# Patient Record
Sex: FEMALE | Race: BLACK OR AFRICAN AMERICAN | NOT HISPANIC OR LATINO
[De-identification: names, ages, dates, MRNs, and addresses within clinical notes are randomized per-mention and may not be internally consistent; named-entity substitution may affect disease eponyms.]

---

## 2022-08-02 ENCOUNTER — APPOINTMENT (OUTPATIENT)
Dept: PULMONOLOGY | Facility: CLINIC | Age: 27
End: 2022-08-02

## 2022-08-02 VITALS
OXYGEN SATURATION: 100 % | WEIGHT: 226 LBS | HEART RATE: 70 BPM | DIASTOLIC BLOOD PRESSURE: 77 MMHG | SYSTOLIC BLOOD PRESSURE: 132 MMHG | BODY MASS INDEX: 37.65 KG/M2 | HEIGHT: 65 IN

## 2022-08-02 DIAGNOSIS — R06.00 DYSPNEA, UNSPECIFIED: ICD-10-CM

## 2022-08-02 DIAGNOSIS — J45.909 UNSPECIFIED ASTHMA, UNCOMPLICATED: ICD-10-CM

## 2022-08-02 PROCEDURE — 94060 EVALUATION OF WHEEZING: CPT

## 2022-08-02 PROCEDURE — 99204 OFFICE O/P NEW MOD 45 MIN: CPT | Mod: 25

## 2022-08-02 PROCEDURE — 94729 DIFFUSING CAPACITY: CPT

## 2022-08-02 PROCEDURE — 94727 GAS DIL/WSHOT DETER LNG VOL: CPT

## 2022-08-02 NOTE — PROCEDURE
[FreeTextEntry1] : The pulmonary function testing done reveals small airways dysfunction and significant bronchodilator response consistent with mild bronchial asthma.

## 2022-08-02 NOTE — DISCUSSION/SUMMARY
[FreeTextEntry1] : Patient has mild bronchial asthma which may be causing dyspnea.  The patient will be given inhaled bronchodilators.\par The patient has clinical features consistent with sleep apnea.  Will do home sleep study.\par The patient was advised about relaxation techniques including yoga and meditation.  Written instructions were given.\par Sleep hygiene techniques were instructed in detail.

## 2022-08-02 NOTE — HISTORY OF PRESENT ILLNESS
[Never] : never [TextBox_4] : 26 year old lady with h/o shortness of breath on and off associated with wheezing.  There is some coughing associated with it. She has been snoring and has witnessed apnea. \par She has gained more than 40 lbs in 2 years. She drinks alcohol 3-4 times a week.\par She has difficulty falling and often takes NyQuil. She sleeps from 11.30 Pm and wakes up at 10 AM. She often wakes up in the middle of the night as she has to use bathroom. \par She works with mentally ill.\par She walks and does not do formal exercise. \par Her father had sleep apnea.\par Her Lincoln scale for daytime somnolence is 11.

## 2022-08-09 ENCOUNTER — APPOINTMENT (OUTPATIENT)
Dept: PULMONOLOGY | Facility: CLINIC | Age: 27
End: 2022-08-09

## 2022-08-29 ENCOUNTER — NON-APPOINTMENT (OUTPATIENT)
Age: 27
End: 2022-08-29

## 2022-09-12 ENCOUNTER — APPOINTMENT (OUTPATIENT)
Dept: PULMONOLOGY | Facility: CLINIC | Age: 27
End: 2022-09-12

## 2022-09-12 VITALS
HEIGHT: 65 IN | OXYGEN SATURATION: 97 % | TEMPERATURE: 96.8 F | HEART RATE: 80 BPM | DIASTOLIC BLOOD PRESSURE: 92 MMHG | BODY MASS INDEX: 37.15 KG/M2 | WEIGHT: 223 LBS | SYSTOLIC BLOOD PRESSURE: 132 MMHG

## 2022-09-12 DIAGNOSIS — G47.01 INSOMNIA DUE TO MEDICAL CONDITION: ICD-10-CM

## 2022-09-12 DIAGNOSIS — G47.33 OBSTRUCTIVE SLEEP APNEA (ADULT) (PEDIATRIC): ICD-10-CM

## 2022-09-12 PROCEDURE — 99214 OFFICE O/P EST MOD 30 MIN: CPT

## 2022-09-12 NOTE — HISTORY OF PRESENT ILLNESS
[Never] : never [TextBox_4] : Ms. Bloom returns today with her mother.  Since the last visit she states that she has improved her diet.  She is drinking much less alcohol.  She has lost 3 pounds of weight about which she is very happy.  Her sleep pattern has not changed.\par She had a home sleep study which reveals moderate sleep apnea.  She had questions about it.  She was advised to start using PAP.\par Cough and dyspnea have resolved.\par \par 8/2/22\par 26 year old lady with h/o shortness of breath on and off associated with wheezing.  There is some coughing associated with it. She has been snoring and has witnessed apnea. \par She has gained more than 40 lbs in 2 years. She drinks alcohol 3-4 times a week.\par She has difficulty falling and often takes NyQuil. She sleeps from 11.30 Pm and wakes up at 10 AM. She often wakes up in the middle of the night as she has to use bathroom. \par She works with mentally ill.\par She walks and does not do formal exercise. \par Her father had sleep apnea.\par Her [place] scale for daytime somnolence is 11.

## 2022-09-12 NOTE — DISCUSSION/SUMMARY
[FreeTextEntry1] : Test results were discussed with the patient.  She was advised that the best treatment modality for her would be  positive airway pressure.  The patient has hypertension at this young age.  So she requires aggressive treatment.\par Agrees to try Pap treatment.\par She was again advised to follow diet and exercise daily.  She was advised to take melatonin and magnesium prior to going to bed.  Sleep hygiene techniques were discussed again.

## 2022-10-10 ENCOUNTER — RX RENEWAL (OUTPATIENT)
Age: 27
End: 2022-10-10

## 2022-10-10 RX ORDER — BUDESONIDE AND FORMOTEROL FUMARATE DIHYDRATE 80; 4.5 UG/1; UG/1
80-4.5 AEROSOL RESPIRATORY (INHALATION)
Qty: 30.6 | Refills: 0 | Status: ACTIVE | COMMUNITY
Start: 2022-08-02 | End: 1900-01-01

## 2022-10-11 ENCOUNTER — APPOINTMENT (OUTPATIENT)
Dept: PULMONOLOGY | Facility: CLINIC | Age: 27
End: 2022-10-11

## 2022-11-21 ENCOUNTER — APPOINTMENT (OUTPATIENT)
Dept: PULMONOLOGY | Facility: CLINIC | Age: 27
End: 2022-11-21

## 2024-05-06 ENCOUNTER — APPOINTMENT (OUTPATIENT)
Dept: OBGYN | Facility: CLINIC | Age: 29
End: 2024-05-06
Payer: MEDICAID

## 2024-05-06 ENCOUNTER — OUTPATIENT (OUTPATIENT)
Dept: OUTPATIENT SERVICES | Facility: HOSPITAL | Age: 29
LOS: 1 days | End: 2024-05-06
Payer: MEDICAID

## 2024-05-06 ENCOUNTER — LABORATORY RESULT (OUTPATIENT)
Age: 29
End: 2024-05-06

## 2024-05-06 VITALS
OXYGEN SATURATION: 99 % | HEIGHT: 65 IN | DIASTOLIC BLOOD PRESSURE: 79 MMHG | RESPIRATION RATE: 16 BRPM | HEART RATE: 90 BPM | SYSTOLIC BLOOD PRESSURE: 114 MMHG | BODY MASS INDEX: 34.99 KG/M2 | TEMPERATURE: 98.1 F | WEIGHT: 210 LBS

## 2024-05-06 DIAGNOSIS — Z34.00 ENCOUNTER FOR SUPERVISION OF NORMAL FIRST PREGNANCY, UNSPECIFIED TRIMESTER: ICD-10-CM

## 2024-05-06 DIAGNOSIS — Z78.9 OTHER SPECIFIED HEALTH STATUS: ICD-10-CM

## 2024-05-06 DIAGNOSIS — Z82.49 FAMILY HISTORY OF ISCHEMIC HEART DISEASE AND OTHER DISEASES OF THE CIRCULATORY SYSTEM: ICD-10-CM

## 2024-05-06 DIAGNOSIS — Z83.3 FAMILY HISTORY OF DIABETES MELLITUS: ICD-10-CM

## 2024-05-06 DIAGNOSIS — Z34.90 ENCOUNTER FOR SUPERVISION OF NORMAL PREGNANCY, UNSPECIFIED, UNSPECIFIED TRIMESTER: ICD-10-CM

## 2024-05-06 PROCEDURE — 87591 N.GONORRHOEAE DNA AMP PROB: CPT

## 2024-05-06 PROCEDURE — 86778 TOXOPLASMA ANTIBODY IGM: CPT

## 2024-05-06 PROCEDURE — 83036 HEMOGLOBIN GLYCOSYLATED A1C: CPT

## 2024-05-06 PROCEDURE — 86787 VARICELLA-ZOSTER ANTIBODY: CPT

## 2024-05-06 PROCEDURE — 86593 SYPHILIS TEST NON-TREP QUANT: CPT

## 2024-05-06 PROCEDURE — 81003 URINALYSIS AUTO W/O SCOPE: CPT

## 2024-05-06 PROCEDURE — 86900 BLOOD TYPING SEROLOGIC ABO: CPT

## 2024-05-06 PROCEDURE — 85025 COMPLETE CBC W/AUTO DIFF WBC: CPT

## 2024-05-06 PROCEDURE — 81220 CFTR GENE COM VARIANTS: CPT

## 2024-05-06 PROCEDURE — 87389 HIV-1 AG W/HIV-1&-2 AB AG IA: CPT

## 2024-05-06 PROCEDURE — 86592 SYPHILIS TEST NON-TREP QUAL: CPT

## 2024-05-06 PROCEDURE — 87086 URINE CULTURE/COLONY COUNT: CPT

## 2024-05-06 PROCEDURE — 86765 RUBEOLA ANTIBODY: CPT

## 2024-05-06 PROCEDURE — 86803 HEPATITIS C AB TEST: CPT

## 2024-05-06 PROCEDURE — 86777 TOXOPLASMA ANTIBODY: CPT

## 2024-05-06 PROCEDURE — 99203 OFFICE O/P NEW LOW 30 MIN: CPT

## 2024-05-06 PROCEDURE — 86780 TREPONEMA PALLIDUM: CPT

## 2024-05-06 PROCEDURE — G0463: CPT

## 2024-05-06 PROCEDURE — 86480 TB TEST CELL IMMUN MEASURE: CPT

## 2024-05-06 PROCEDURE — 83655 ASSAY OF LEAD: CPT

## 2024-05-06 PROCEDURE — 87340 HEPATITIS B SURFACE AG IA: CPT

## 2024-05-06 PROCEDURE — 81243 FMR1 GEN ALY DETC ABNL ALLEL: CPT

## 2024-05-06 PROCEDURE — 86850 RBC ANTIBODY SCREEN: CPT

## 2024-05-06 PROCEDURE — 86762 RUBELLA ANTIBODY: CPT

## 2024-05-06 PROCEDURE — 81025 URINE PREGNANCY TEST: CPT

## 2024-05-06 PROCEDURE — 81329 SMN1 GENE DOS/DELETION ALYS: CPT

## 2024-05-06 PROCEDURE — 36415 COLL VENOUS BLD VENIPUNCTURE: CPT

## 2024-05-06 PROCEDURE — 83020 HEMOGLOBIN ELECTROPHORESIS: CPT

## 2024-05-06 PROCEDURE — 87491 CHLMYD TRACH DNA AMP PROBE: CPT

## 2024-05-06 RX ORDER — PRENATAL VIT NO.130/IRON/FOLIC 27MG-0.8MG
27-0.8 TABLET ORAL DAILY
Qty: 30 | Refills: 11 | Status: ACTIVE | COMMUNITY
Start: 2024-05-06 | End: 1900-01-01

## 2024-05-07 DIAGNOSIS — Z34.90 ENCOUNTER FOR SUPERVISION OF NORMAL PREGNANCY, UNSPECIFIED, UNSPECIFIED TRIMESTER: ICD-10-CM

## 2024-05-07 LAB
ABO + RH PNL BLD: NORMAL
BASOPHILS # BLD AUTO: 0.01 K/UL
BASOPHILS NFR BLD AUTO: 0.2 %
BLD GP AB SCN SERPL QL: NORMAL
C TRACH RRNA SPEC QL NAA+PROBE: NOT DETECTED
EOSINOPHIL # BLD AUTO: 0.04 K/UL
EOSINOPHIL NFR BLD AUTO: 0.6 %
ESTIMATED AVERAGE GLUCOSE: 97 MG/DL
HBA1C MFR BLD HPLC: 5 %
HCT VFR BLD CALC: 38.1 %
HGB A MFR BLD: 97.5 %
HGB A2 MFR BLD: 2.5 %
HGB BLD-MCNC: 13.1 G/DL
HGB FRACT BLD-IMP: NORMAL
HIV1+2 AB SPEC QL IA.RAPID: NONREACTIVE
IMM GRANULOCYTES NFR BLD AUTO: 0.2 %
LEAD BLD-MCNC: <1 UG/DL
LYMPHOCYTES # BLD AUTO: 1.64 K/UL
LYMPHOCYTES NFR BLD AUTO: 25 %
MAN DIFF?: NORMAL
MCHC RBC-ENTMCNC: 30.5 PG
MCHC RBC-ENTMCNC: 34.4 GM/DL
MCV RBC AUTO: 88.6 FL
MEV IGG FLD QL IA: 135 AU/ML
MEV IGG+IGM SER-IMP: POSITIVE
MONOCYTES # BLD AUTO: 0.46 K/UL
MONOCYTES NFR BLD AUTO: 7 %
N GONORRHOEA RRNA SPEC QL NAA+PROBE: NOT DETECTED
NEUTROPHILS # BLD AUTO: 4.4 K/UL
NEUTROPHILS NFR BLD AUTO: 67 %
PLATELET # BLD AUTO: 316 K/UL
RBC # BLD: 4.3 M/UL
RBC # FLD: 13 %
RUBV IGG FLD-ACNC: 1.9 INDEX
RUBV IGG SER-IMP: POSITIVE
SOURCE TP AMPLIFICATION: NORMAL
T GONDII AB SER-IMP: NEGATIVE
T GONDII AB SER-IMP: NEGATIVE
T GONDII IGG SER QL: <3 IU/ML
T GONDII IGM SER QL: <3 AU/ML
VZV AB TITR SER: POSITIVE
VZV IGG SER IF-ACNC: 3429 INDEX
WBC # FLD AUTO: 6.56 K/UL

## 2024-05-09 ENCOUNTER — NON-APPOINTMENT (OUTPATIENT)
Age: 29
End: 2024-05-09

## 2024-05-09 LAB
BACTERIA UR CULT: NORMAL
CYTOLOGY CVX/VAG DOC THIN PREP: NORMAL
HBV SURFACE AG SER QL: NONREACTIVE
HCV AB SER QL: NONREACTIVE
HCV S/CO RATIO: 0.11 S/CO
M TB IFN-G BLD-IMP: ABNORMAL
QUANTIFERON TB PLUS MITOGEN MINUS NIL: 0.27 IU/ML
QUANTIFERON TB PLUS NIL: 0.04 IU/ML
QUANTIFERON TB PLUS TB1 MINUS NIL: 0 IU/ML
QUANTIFERON TB PLUS TB2 MINUS NIL: 0 IU/ML
T PALLIDUM AB SER QL IA: POSITIVE

## 2024-05-10 LAB — AR GENE MUT ANL BLD/T: NORMAL

## 2024-05-13 LAB — FMR1 GENE MUT ANL BLD/T: NORMAL

## 2024-05-14 LAB — CFTR MUT TESTED BLD/T: NEGATIVE

## 2024-05-29 ENCOUNTER — APPOINTMENT (OUTPATIENT)
Dept: ANTEPARTUM | Facility: CLINIC | Age: 29
End: 2024-05-29

## 2024-05-30 ENCOUNTER — APPOINTMENT (OUTPATIENT)
Dept: ANTEPARTUM | Facility: CLINIC | Age: 29
End: 2024-05-30
Payer: MEDICAID

## 2024-05-30 ENCOUNTER — ASOB RESULT (OUTPATIENT)
Age: 29
End: 2024-05-30

## 2024-05-30 PROCEDURE — 76813 OB US NUCHAL MEAS 1 GEST: CPT | Mod: 59

## 2024-05-30 PROCEDURE — 76801 OB US < 14 WKS SINGLE FETUS: CPT

## 2024-06-03 ENCOUNTER — NON-APPOINTMENT (OUTPATIENT)
Age: 29
End: 2024-06-03

## 2024-06-03 ENCOUNTER — APPOINTMENT (OUTPATIENT)
Dept: OBGYN | Facility: CLINIC | Age: 29
End: 2024-06-03
Payer: MEDICAID

## 2024-06-03 ENCOUNTER — APPOINTMENT (OUTPATIENT)
Dept: OBGYN | Facility: CLINIC | Age: 29
End: 2024-06-03

## 2024-06-03 ENCOUNTER — OUTPATIENT (OUTPATIENT)
Dept: OUTPATIENT SERVICES | Facility: HOSPITAL | Age: 29
LOS: 1 days | End: 2024-06-03
Payer: MEDICAID

## 2024-06-03 VITALS
DIASTOLIC BLOOD PRESSURE: 85 MMHG | HEIGHT: 65 IN | WEIGHT: 207.06 LBS | TEMPERATURE: 97.2 F | RESPIRATION RATE: 18 BRPM | SYSTOLIC BLOOD PRESSURE: 129 MMHG | OXYGEN SATURATION: 99 % | BODY MASS INDEX: 34.5 KG/M2 | HEART RATE: 94 BPM

## 2024-06-03 DIAGNOSIS — Z34.00 ENCOUNTER FOR SUPERVISION OF NORMAL FIRST PREGNANCY, UNSPECIFIED TRIMESTER: ICD-10-CM

## 2024-06-03 DIAGNOSIS — Z3A.08 8 WEEKS GESTATION OF PREGNANCY: ICD-10-CM

## 2024-06-03 PROCEDURE — 99213 OFFICE O/P EST LOW 20 MIN: CPT

## 2024-06-03 PROCEDURE — G0463: CPT

## 2024-06-03 PROCEDURE — 81003 URINALYSIS AUTO W/O SCOPE: CPT

## 2024-06-03 PROCEDURE — 81420 FETAL CHRMOML ANEUPLOIDY: CPT

## 2024-06-03 PROCEDURE — 86480 TB TEST CELL IMMUN MEASURE: CPT

## 2024-06-05 DIAGNOSIS — Z34.91 ENCOUNTER FOR SUPERVISION OF NORMAL PREGNANCY, UNSPECIFIED, FIRST TRIMESTER: ICD-10-CM

## 2024-06-05 DIAGNOSIS — Z3A.08 8 WEEKS GESTATION OF PREGNANCY: ICD-10-CM

## 2024-06-05 DIAGNOSIS — Z34.92 ENCOUNTER FOR SUPERVISION OF NORMAL PREGNANCY, UNSPECIFIED, SECOND TRIMESTER: ICD-10-CM

## 2024-06-06 LAB
M TB IFN-G BLD-IMP: NEGATIVE
QUANTIFERON TB PLUS MITOGEN MINUS NIL: 1.39 IU/ML
QUANTIFERON TB PLUS NIL: 0.02 IU/ML
QUANTIFERON TB PLUS TB1 MINUS NIL: 0 IU/ML
QUANTIFERON TB PLUS TB2 MINUS NIL: 0 IU/ML

## 2024-06-10 LAB
CHROMOSOME13 INTERPRETATION: NORMAL
CHROMOSOME13 TEST RESULT: NORMAL
CHROMOSOME18 INTERPRETATION: NORMAL
CHROMOSOME18 TEST RESULT: NORMAL
CHROMOSOME21 INTERPRETATION: NORMAL
CHROMOSOME21 TEST RESULT: NORMAL
FETAL FRACTION: NORMAL
PERFORMANCE AND LIMITATIONS: NORMAL
SEX CHROMOSOME INTERPRETATION: NORMAL
SEX CHROMOSOME TEST RESULT: NORMAL
VERIFI PRENATAL TEST: NOT DETECTED

## 2024-07-05 ENCOUNTER — APPOINTMENT (OUTPATIENT)
Dept: OBGYN | Facility: CLINIC | Age: 29
End: 2024-07-05
Payer: MEDICAID

## 2024-07-05 ENCOUNTER — OUTPATIENT (OUTPATIENT)
Dept: OUTPATIENT SERVICES | Facility: HOSPITAL | Age: 29
LOS: 1 days | End: 2024-07-05
Payer: MEDICAID

## 2024-07-05 VITALS
DIASTOLIC BLOOD PRESSURE: 77 MMHG | WEIGHT: 208 LBS | HEART RATE: 102 BPM | OXYGEN SATURATION: 98 % | RESPIRATION RATE: 17 BRPM | SYSTOLIC BLOOD PRESSURE: 110 MMHG | TEMPERATURE: 97.2 F | HEIGHT: 65 IN | BODY MASS INDEX: 34.66 KG/M2

## 2024-07-05 DIAGNOSIS — Z34.00 ENCOUNTER FOR SUPERVISION OF NORMAL FIRST PREGNANCY, UNSPECIFIED TRIMESTER: ICD-10-CM

## 2024-07-05 DIAGNOSIS — Z34.90 ENCOUNTER FOR SUPERVISION OF NORMAL PREGNANCY, UNSPECIFIED, UNSPECIFIED TRIMESTER: ICD-10-CM

## 2024-07-05 DIAGNOSIS — Z3A.08 8 WEEKS GESTATION OF PREGNANCY: ICD-10-CM

## 2024-07-05 PROCEDURE — 82105 ALPHA-FETOPROTEIN SERUM: CPT

## 2024-07-05 PROCEDURE — 36415 COLL VENOUS BLD VENIPUNCTURE: CPT

## 2024-07-05 PROCEDURE — 81003 URINALYSIS AUTO W/O SCOPE: CPT

## 2024-07-05 PROCEDURE — 99213 OFFICE O/P EST LOW 20 MIN: CPT

## 2024-07-05 PROCEDURE — G0463: CPT

## 2024-07-08 LAB
AF-AFP DISCLAIMER: NORMAL
AF-AFP MOM: 1.08
AFP CONCENTRATION: 36.28 NG/ML
AFP INTERPRETATION: NORMAL
AFP MOM CUT-OFF: 2.8
AFP PERCENTILE: 59.3
AFP SCREENING RESULT: NORMAL
AFTER SCREENING RISK OPEN SPINA BIFIDA: NORMAL
BEFORE SCREENING RISK OPEN SPINA BIFIDA: NORMAL
EXTREME ANALYTE ALERT: NO
GESTATIONAL  AGE: NORMAL
MATERNAL WGT: 208
RACE/ETHNICITY: NORMAL

## 2024-07-09 DIAGNOSIS — Z34.90 ENCOUNTER FOR SUPERVISION OF NORMAL PREGNANCY, UNSPECIFIED, UNSPECIFIED TRIMESTER: ICD-10-CM

## 2024-07-15 ENCOUNTER — EMERGENCY (EMERGENCY)
Facility: HOSPITAL | Age: 29
LOS: 1 days | Discharge: HOME | End: 2024-07-15
Attending: STUDENT IN AN ORGANIZED HEALTH CARE EDUCATION/TRAINING PROGRAM
Payer: MEDICAID

## 2024-07-15 VITALS
SYSTOLIC BLOOD PRESSURE: 117 MMHG | TEMPERATURE: 98 F | RESPIRATION RATE: 18 BRPM | HEIGHT: 64 IN | DIASTOLIC BLOOD PRESSURE: 78 MMHG | HEART RATE: 88 BPM | WEIGHT: 207.9 LBS | OXYGEN SATURATION: 100 %

## 2024-07-15 PROCEDURE — 99282 EMERGENCY DEPT VISIT SF MDM: CPT

## 2024-07-15 PROCEDURE — 99284 EMERGENCY DEPT VISIT MOD MDM: CPT

## 2024-07-26 ENCOUNTER — APPOINTMENT (OUTPATIENT)
Dept: ANTEPARTUM | Facility: CLINIC | Age: 29
End: 2024-07-26
Payer: MEDICAID

## 2024-07-26 ENCOUNTER — ASOB RESULT (OUTPATIENT)
Age: 29
End: 2024-07-26

## 2024-07-26 PROCEDURE — 76811 OB US DETAILED SNGL FETUS: CPT

## 2024-08-09 ENCOUNTER — APPOINTMENT (OUTPATIENT)
Dept: OBGYN | Facility: CLINIC | Age: 29
End: 2024-08-09

## 2024-08-09 ENCOUNTER — ASOB RESULT (OUTPATIENT)
Age: 29
End: 2024-08-09

## 2024-08-09 ENCOUNTER — OUTPATIENT (OUTPATIENT)
Dept: OUTPATIENT SERVICES | Facility: HOSPITAL | Age: 29
LOS: 1 days | End: 2024-08-09
Payer: MEDICAID

## 2024-08-09 ENCOUNTER — APPOINTMENT (OUTPATIENT)
Dept: ANTEPARTUM | Facility: CLINIC | Age: 29
End: 2024-08-09

## 2024-08-09 DIAGNOSIS — Z34.00 ENCOUNTER FOR SUPERVISION OF NORMAL FIRST PREGNANCY, UNSPECIFIED TRIMESTER: ICD-10-CM

## 2024-08-09 PROBLEM — G47.01 INSOMNIA DUE TO MEDICAL CONDITION: Status: RESOLVED | Noted: 2022-08-02 | Resolved: 2024-08-09

## 2024-08-09 PROCEDURE — 81003 URINALYSIS AUTO W/O SCOPE: CPT

## 2024-08-09 PROCEDURE — 99213 OFFICE O/P EST LOW 20 MIN: CPT

## 2024-08-09 PROCEDURE — G0463: CPT

## 2024-08-09 PROCEDURE — 76816 OB US FOLLOW-UP PER FETUS: CPT

## 2024-08-12 DIAGNOSIS — Z34.90 ENCOUNTER FOR SUPERVISION OF NORMAL PREGNANCY, UNSPECIFIED, UNSPECIFIED TRIMESTER: ICD-10-CM

## 2024-08-19 ENCOUNTER — NON-APPOINTMENT (OUTPATIENT)
Age: 29
End: 2024-08-19

## 2024-09-06 ENCOUNTER — OUTPATIENT (OUTPATIENT)
Dept: OUTPATIENT SERVICES | Facility: HOSPITAL | Age: 29
LOS: 1 days | End: 2024-09-06
Payer: MEDICAID

## 2024-09-06 ENCOUNTER — LABORATORY RESULT (OUTPATIENT)
Age: 29
End: 2024-09-06

## 2024-09-06 ENCOUNTER — APPOINTMENT (OUTPATIENT)
Dept: OBGYN | Facility: CLINIC | Age: 29
End: 2024-09-06
Payer: MEDICAID

## 2024-09-06 VITALS
HEART RATE: 96 BPM | BODY MASS INDEX: 37.32 KG/M2 | DIASTOLIC BLOOD PRESSURE: 86 MMHG | TEMPERATURE: 98.6 F | HEIGHT: 65 IN | WEIGHT: 224 LBS | SYSTOLIC BLOOD PRESSURE: 124 MMHG | OXYGEN SATURATION: 95 %

## 2024-09-06 VITALS — SYSTOLIC BLOOD PRESSURE: 98 MMHG | DIASTOLIC BLOOD PRESSURE: 66 MMHG

## 2024-09-06 DIAGNOSIS — Z34.00 ENCOUNTER FOR SUPERVISION OF NORMAL FIRST PREGNANCY, UNSPECIFIED TRIMESTER: ICD-10-CM

## 2024-09-06 DIAGNOSIS — E66.9 OBESITY, UNSPECIFIED: ICD-10-CM

## 2024-09-06 DIAGNOSIS — Z34.90 ENCOUNTER FOR SUPERVISION OF NORMAL PREGNANCY, UNSPECIFIED, UNSPECIFIED TRIMESTER: ICD-10-CM

## 2024-09-06 PROCEDURE — 99213 OFFICE O/P EST LOW 20 MIN: CPT

## 2024-09-06 PROCEDURE — 86592 SYPHILIS TEST NON-TREP QUAL: CPT

## 2024-09-06 PROCEDURE — 84156 ASSAY OF PROTEIN URINE: CPT

## 2024-09-06 PROCEDURE — 36415 COLL VENOUS BLD VENIPUNCTURE: CPT

## 2024-09-06 PROCEDURE — 82570 ASSAY OF URINE CREATININE: CPT

## 2024-09-06 PROCEDURE — 81003 URINALYSIS AUTO W/O SCOPE: CPT

## 2024-09-06 PROCEDURE — G0463: CPT

## 2024-09-06 PROCEDURE — 84550 ASSAY OF BLOOD/URIC ACID: CPT

## 2024-09-06 PROCEDURE — 86593 SYPHILIS TEST NON-TREP QUANT: CPT

## 2024-09-06 PROCEDURE — 85025 COMPLETE CBC W/AUTO DIFF WBC: CPT

## 2024-09-06 PROCEDURE — 80053 COMPREHEN METABOLIC PANEL: CPT

## 2024-09-06 PROCEDURE — 82950 GLUCOSE TEST: CPT

## 2024-09-06 PROCEDURE — 86780 TREPONEMA PALLIDUM: CPT

## 2024-09-06 RX ORDER — BLOOD PRESSURE TEST KIT-MEDIUM
KIT MISCELLANEOUS
Qty: 1 | Refills: 0 | Status: ACTIVE | COMMUNITY
Start: 2024-09-06 | End: 1900-01-01

## 2024-09-09 LAB
ALBUMIN SERPL ELPH-MCNC: 3.6 G/DL
ALP BLD-CCNC: 36 U/L
ALT SERPL-CCNC: 15 U/L
ANION GAP SERPL CALC-SCNC: 11 MMOL/L
AST SERPL-CCNC: 13 U/L
BASOPHILS # BLD AUTO: 0.02 K/UL
BASOPHILS NFR BLD AUTO: 0.3 %
BILIRUB SERPL-MCNC: 0.2 MG/DL
BUN SERPL-MCNC: 5 MG/DL
CALCIUM SERPL-MCNC: 8.9 MG/DL
CHLORIDE SERPL-SCNC: 106 MMOL/L
CO2 SERPL-SCNC: 20 MMOL/L
CREAT SERPL-MCNC: 0.6 MG/DL
CREAT SPEC-SCNC: 62 MG/DL
CREAT/PROT UR: 0.1 RATIO
EGFR: 125 ML/MIN/1.73M2
EOSINOPHIL # BLD AUTO: 0.05 K/UL
EOSINOPHIL NFR BLD AUTO: 0.7 %
GLUCOSE 1H P 50 G GLC PO SERPL-MCNC: 107 MG/DL
GLUCOSE SERPL-MCNC: 105 MG/DL
HCT VFR BLD CALC: 34.3 %
HGB BLD-MCNC: 11.7 G/DL
IMM GRANULOCYTES NFR BLD AUTO: 0.5 %
LYMPHOCYTES # BLD AUTO: 1.38 K/UL
LYMPHOCYTES NFR BLD AUTO: 18.5 %
MAN DIFF?: NORMAL
MCHC RBC-ENTMCNC: 31.9 PG
MCHC RBC-ENTMCNC: 34.1 GM/DL
MCV RBC AUTO: 93.5 FL
MONOCYTES # BLD AUTO: 0.57 K/UL
MONOCYTES NFR BLD AUTO: 7.7 %
NEUTROPHILS # BLD AUTO: 5.38 K/UL
NEUTROPHILS NFR BLD AUTO: 72.3 %
PLATELET # BLD AUTO: 256 K/UL
POTASSIUM SERPL-SCNC: 3.9 MMOL/L
PROT SERPL-MCNC: 6.1 G/DL
PROT UR-MCNC: 8 MG/DL
RBC # BLD: 3.67 M/UL
RBC # FLD: 13.2 %
SODIUM SERPL-SCNC: 137 MMOL/L
T PALLIDUM AB SER QL IA: POSITIVE
URATE SERPL-MCNC: 2.3 MG/DL
WBC # FLD AUTO: 7.44 K/UL

## 2024-09-10 DIAGNOSIS — Z34.90 ENCOUNTER FOR SUPERVISION OF NORMAL PREGNANCY, UNSPECIFIED, UNSPECIFIED TRIMESTER: ICD-10-CM

## 2024-09-10 DIAGNOSIS — E66.9 OBESITY, UNSPECIFIED: ICD-10-CM

## 2024-09-26 ENCOUNTER — NON-APPOINTMENT (OUTPATIENT)
Age: 29
End: 2024-09-26

## 2024-09-27 ENCOUNTER — APPOINTMENT (OUTPATIENT)
Dept: OBGYN | Facility: CLINIC | Age: 29
End: 2024-09-27
Payer: MEDICAID

## 2024-09-27 ENCOUNTER — OUTPATIENT (OUTPATIENT)
Dept: OUTPATIENT SERVICES | Facility: HOSPITAL | Age: 29
LOS: 1 days | End: 2024-09-27
Payer: MEDICAID

## 2024-09-27 VITALS
SYSTOLIC BLOOD PRESSURE: 122 MMHG | HEART RATE: 93 BPM | BODY MASS INDEX: 38.15 KG/M2 | OXYGEN SATURATION: 99 % | RESPIRATION RATE: 18 BRPM | TEMPERATURE: 97.9 F | DIASTOLIC BLOOD PRESSURE: 80 MMHG | HEIGHT: 65 IN | WEIGHT: 229 LBS

## 2024-09-27 VITALS — SYSTOLIC BLOOD PRESSURE: 122 MMHG | DIASTOLIC BLOOD PRESSURE: 80 MMHG

## 2024-09-27 DIAGNOSIS — Z34.90 ENCOUNTER FOR SUPERVISION OF NORMAL PREGNANCY, UNSPECIFIED, UNSPECIFIED TRIMESTER: ICD-10-CM

## 2024-09-27 DIAGNOSIS — E66.9 OBESITY, UNSPECIFIED: ICD-10-CM

## 2024-09-27 DIAGNOSIS — Z34.00 ENCOUNTER FOR SUPERVISION OF NORMAL FIRST PREGNANCY, UNSPECIFIED TRIMESTER: ICD-10-CM

## 2024-09-27 PROCEDURE — 81003 URINALYSIS AUTO W/O SCOPE: CPT

## 2024-09-27 PROCEDURE — 84156 ASSAY OF PROTEIN URINE: CPT

## 2024-09-27 PROCEDURE — 99213 OFFICE O/P EST LOW 20 MIN: CPT

## 2024-09-27 PROCEDURE — 82570 ASSAY OF URINE CREATININE: CPT

## 2024-09-27 PROCEDURE — 86480 TB TEST CELL IMMUN MEASURE: CPT

## 2024-09-27 PROCEDURE — G0463: CPT

## 2024-09-27 PROCEDURE — 80053 COMPREHEN METABOLIC PANEL: CPT

## 2024-09-27 PROCEDURE — 85025 COMPLETE CBC W/AUTO DIFF WBC: CPT

## 2024-09-27 PROCEDURE — 84550 ASSAY OF BLOOD/URIC ACID: CPT

## 2024-09-30 DIAGNOSIS — E66.9 OBESITY, UNSPECIFIED: ICD-10-CM

## 2024-09-30 DIAGNOSIS — Z34.90 ENCOUNTER FOR SUPERVISION OF NORMAL PREGNANCY, UNSPECIFIED, UNSPECIFIED TRIMESTER: ICD-10-CM

## 2024-09-30 LAB
ALBUMIN SERPL ELPH-MCNC: 3.5 G/DL
ALP BLD-CCNC: 47 U/L
ALT SERPL-CCNC: 16 U/L
ANION GAP SERPL CALC-SCNC: 13 MMOL/L
AST SERPL-CCNC: 15 U/L
BASOPHILS # BLD AUTO: 0.01 K/UL
BASOPHILS NFR BLD AUTO: 0.1 %
BILIRUB SERPL-MCNC: 0.3 MG/DL
BUN SERPL-MCNC: 5 MG/DL
CALCIUM SERPL-MCNC: 9.4 MG/DL
CHLORIDE SERPL-SCNC: 106 MMOL/L
CO2 SERPL-SCNC: 19 MMOL/L
CREAT SERPL-MCNC: 0.61 MG/DL
CREAT SPEC-SCNC: 57 MG/DL
CREAT/PROT UR: 0.2 RATIO
EGFR: 125 ML/MIN/1.73M2
EOSINOPHIL # BLD AUTO: 0.04 K/UL
EOSINOPHIL NFR BLD AUTO: 0.5 %
GLUCOSE SERPL-MCNC: 69 MG/DL
HCT VFR BLD CALC: 35.6 %
HGB BLD-MCNC: 12.2 G/DL
IMM GRANULOCYTES NFR BLD AUTO: 0.5 %
LYMPHOCYTES # BLD AUTO: 1.51 K/UL
LYMPHOCYTES NFR BLD AUTO: 20.7 %
MAN DIFF?: NORMAL
MCHC RBC-ENTMCNC: 31.9 PG
MCHC RBC-ENTMCNC: 34.3 GM/DL
MCV RBC AUTO: 93 FL
MONOCYTES # BLD AUTO: 0.55 K/UL
MONOCYTES NFR BLD AUTO: 7.6 %
NEUTROPHILS # BLD AUTO: 5.13 K/UL
NEUTROPHILS NFR BLD AUTO: 70.6 %
PLATELET # BLD AUTO: 236 K/UL
POTASSIUM SERPL-SCNC: 4.2 MMOL/L
PROT SERPL-MCNC: 6.4 G/DL
PROT UR-MCNC: 8 MG/DL
RBC # BLD: 3.83 M/UL
RBC # FLD: 12.8 %
SODIUM SERPL-SCNC: 138 MMOL/L
URATE SERPL-MCNC: 2.4 MG/DL
WBC # FLD AUTO: 7.28 K/UL

## 2024-10-03 LAB
M TB IFN-G BLD-IMP: NEGATIVE
QUANTIFERON TB PLUS MITOGEN MINUS NIL: 3.54 IU/ML
QUANTIFERON TB PLUS NIL: 0.02 IU/ML
QUANTIFERON TB PLUS TB1 MINUS NIL: 0 IU/ML
QUANTIFERON TB PLUS TB2 MINUS NIL: 0 IU/ML

## 2024-10-17 ENCOUNTER — NON-APPOINTMENT (OUTPATIENT)
Age: 29
End: 2024-10-17

## 2024-10-18 ENCOUNTER — ASOB RESULT (OUTPATIENT)
Age: 29
End: 2024-10-18

## 2024-10-18 ENCOUNTER — OUTPATIENT (OUTPATIENT)
Dept: OUTPATIENT SERVICES | Facility: HOSPITAL | Age: 29
LOS: 1 days | End: 2024-10-18
Payer: MEDICAID

## 2024-10-18 ENCOUNTER — APPOINTMENT (OUTPATIENT)
Dept: OBGYN | Facility: CLINIC | Age: 29
End: 2024-10-18
Payer: MEDICAID

## 2024-10-18 ENCOUNTER — APPOINTMENT (OUTPATIENT)
Dept: ANTEPARTUM | Facility: CLINIC | Age: 29
End: 2024-10-18
Payer: MEDICAID

## 2024-10-18 VITALS
DIASTOLIC BLOOD PRESSURE: 80 MMHG | RESPIRATION RATE: 18 BRPM | BODY MASS INDEX: 38.15 KG/M2 | HEART RATE: 100 BPM | SYSTOLIC BLOOD PRESSURE: 133 MMHG | WEIGHT: 229 LBS | TEMPERATURE: 98.1 F | HEIGHT: 65 IN | OXYGEN SATURATION: 99 %

## 2024-10-18 VITALS — DIASTOLIC BLOOD PRESSURE: 82 MMHG | SYSTOLIC BLOOD PRESSURE: 124 MMHG

## 2024-10-18 DIAGNOSIS — Z00.00 ENCOUNTER FOR GENERAL ADULT MEDICAL EXAMINATION WITHOUT ABNORMAL FINDINGS: ICD-10-CM

## 2024-10-18 PROCEDURE — 85025 COMPLETE CBC W/AUTO DIFF WBC: CPT

## 2024-10-18 PROCEDURE — 99213 OFFICE O/P EST LOW 20 MIN: CPT

## 2024-10-18 PROCEDURE — 84156 ASSAY OF PROTEIN URINE: CPT

## 2024-10-18 PROCEDURE — 82570 ASSAY OF URINE CREATININE: CPT

## 2024-10-18 PROCEDURE — 84550 ASSAY OF BLOOD/URIC ACID: CPT

## 2024-10-18 PROCEDURE — 81003 URINALYSIS AUTO W/O SCOPE: CPT

## 2024-10-18 PROCEDURE — G0463: CPT

## 2024-10-18 PROCEDURE — 80053 COMPREHEN METABOLIC PANEL: CPT

## 2024-10-18 PROCEDURE — 76816 OB US FOLLOW-UP PER FETUS: CPT

## 2024-10-18 PROCEDURE — 76819 FETAL BIOPHYS PROFIL W/O NST: CPT | Mod: 59

## 2024-10-21 LAB
ALBUMIN SERPL ELPH-MCNC: 3.5 G/DL
ALP BLD-CCNC: 73 U/L
ALT SERPL-CCNC: 13 U/L
ANION GAP SERPL CALC-SCNC: 14 MMOL/L
AST SERPL-CCNC: 16 U/L
BASOPHILS # BLD AUTO: 0.02 K/UL
BASOPHILS NFR BLD AUTO: 0.3 %
BILIRUB SERPL-MCNC: 0.2 MG/DL
BUN SERPL-MCNC: 6 MG/DL
CALCIUM SERPL-MCNC: 9.2 MG/DL
CHLORIDE SERPL-SCNC: 105 MMOL/L
CO2 SERPL-SCNC: 18 MMOL/L
CREAT SERPL-MCNC: 0.65 MG/DL
CREAT SPEC-SCNC: 105 MG/DL
CREAT/PROT UR: 0.2 RATIO
EGFR: 122 ML/MIN/1.73M2
EOSINOPHIL # BLD AUTO: 0.06 K/UL
EOSINOPHIL NFR BLD AUTO: 0.8 %
GLUCOSE SERPL-MCNC: 158 MG/DL
HCT VFR BLD CALC: 38.1 %
HGB BLD-MCNC: 12.7 G/DL
IMM GRANULOCYTES NFR BLD AUTO: 0.9 %
LYMPHOCYTES # BLD AUTO: 1.67 K/UL
LYMPHOCYTES NFR BLD AUTO: 22.3 %
MAN DIFF?: NORMAL
MCHC RBC-ENTMCNC: 31.2 PG
MCHC RBC-ENTMCNC: 33.3 GM/DL
MCV RBC AUTO: 93.6 FL
MONOCYTES # BLD AUTO: 0.42 K/UL
MONOCYTES NFR BLD AUTO: 5.6 %
NEUTROPHILS # BLD AUTO: 5.25 K/UL
NEUTROPHILS NFR BLD AUTO: 70.1 %
PLATELET # BLD AUTO: 250 K/UL
POTASSIUM SERPL-SCNC: 4.1 MMOL/L
PROT SERPL-MCNC: 6.3 G/DL
PROT UR-MCNC: 19 MG/DL
RBC # BLD: 4.07 M/UL
RBC # FLD: 13 %
SODIUM SERPL-SCNC: 137 MMOL/L
URATE SERPL-MCNC: 2.7 MG/DL
WBC # FLD AUTO: 7.49 K/UL

## 2024-10-22 DIAGNOSIS — Z3A.32 32 WEEKS GESTATION OF PREGNANCY: ICD-10-CM

## 2024-10-22 DIAGNOSIS — Z34.90 ENCOUNTER FOR SUPERVISION OF NORMAL PREGNANCY, UNSPECIFIED, UNSPECIFIED TRIMESTER: ICD-10-CM

## 2024-10-22 DIAGNOSIS — R03.0 ELEVATED BLOOD-PRESSURE READING, WITHOUT DIAGNOSIS OF HYPERTENSION: ICD-10-CM

## 2024-10-25 ENCOUNTER — OUTPATIENT (OUTPATIENT)
Dept: OUTPATIENT SERVICES | Facility: HOSPITAL | Age: 29
LOS: 1 days | End: 2024-10-25
Payer: MEDICAID

## 2024-10-25 ENCOUNTER — APPOINTMENT (OUTPATIENT)
Dept: OBGYN | Facility: CLINIC | Age: 29
End: 2024-10-25
Payer: MEDICAID

## 2024-10-25 VITALS
HEART RATE: 96 BPM | OXYGEN SATURATION: 96 % | WEIGHT: 231 LBS | DIASTOLIC BLOOD PRESSURE: 88 MMHG | TEMPERATURE: 96.6 F | RESPIRATION RATE: 18 BRPM | BODY MASS INDEX: 38.49 KG/M2 | SYSTOLIC BLOOD PRESSURE: 139 MMHG | HEIGHT: 65 IN

## 2024-10-25 VITALS — DIASTOLIC BLOOD PRESSURE: 84 MMHG | SYSTOLIC BLOOD PRESSURE: 126 MMHG

## 2024-10-25 VITALS — DIASTOLIC BLOOD PRESSURE: 88 MMHG | SYSTOLIC BLOOD PRESSURE: 130 MMHG

## 2024-10-25 DIAGNOSIS — E66.812 OBESITY, CLASS 2: ICD-10-CM

## 2024-10-25 DIAGNOSIS — Z34.00 ENCOUNTER FOR SUPERVISION OF NORMAL FIRST PREGNANCY, UNSPECIFIED TRIMESTER: ICD-10-CM

## 2024-10-25 PROBLEM — R03.0 BORDERLINE BLOOD PRESSURE: Status: ACTIVE | Noted: 2024-10-18

## 2024-10-25 PROCEDURE — 36415 COLL VENOUS BLD VENIPUNCTURE: CPT

## 2024-10-25 PROCEDURE — 82570 ASSAY OF URINE CREATININE: CPT

## 2024-10-25 PROCEDURE — G0463: CPT

## 2024-10-25 PROCEDURE — 99213 OFFICE O/P EST LOW 20 MIN: CPT

## 2024-10-25 PROCEDURE — 84156 ASSAY OF PROTEIN URINE: CPT

## 2024-10-25 PROCEDURE — 81003 URINALYSIS AUTO W/O SCOPE: CPT

## 2024-10-25 PROCEDURE — 85025 COMPLETE CBC W/AUTO DIFF WBC: CPT

## 2024-10-25 PROCEDURE — 80053 COMPREHEN METABOLIC PANEL: CPT

## 2024-10-25 PROCEDURE — 84550 ASSAY OF BLOOD/URIC ACID: CPT

## 2024-10-28 LAB
ALBUMIN SERPL ELPH-MCNC: 3.2 G/DL
ALP BLD-CCNC: 75 U/L
ALT SERPL-CCNC: 13 U/L
ANION GAP SERPL CALC-SCNC: 13 MMOL/L
AST SERPL-CCNC: 18 U/L
BASOPHILS # BLD AUTO: 0.01 K/UL
BASOPHILS NFR BLD AUTO: 0.2 %
BILIRUB SERPL-MCNC: 0.3 MG/DL
BUN SERPL-MCNC: 4 MG/DL
CALCIUM SERPL-MCNC: 8.8 MG/DL
CHLORIDE SERPL-SCNC: 109 MMOL/L
CO2 SERPL-SCNC: 18 MMOL/L
CREAT SERPL-MCNC: 0.65 MG/DL
CREAT SPEC-SCNC: 101 MG/DL
CREAT/PROT UR: 0.3 RATIO
EGFR: 122 ML/MIN/1.73M2
EOSINOPHIL # BLD AUTO: 0.05 K/UL
EOSINOPHIL NFR BLD AUTO: 0.8 %
GLUCOSE SERPL-MCNC: 122 MG/DL
HCT VFR BLD CALC: 37.2 %
HGB BLD-MCNC: 12.4 G/DL
IMM GRANULOCYTES NFR BLD AUTO: 0.8 %
LYMPHOCYTES # BLD AUTO: 1.47 K/UL
LYMPHOCYTES NFR BLD AUTO: 22.1 %
MAN DIFF?: NORMAL
MCHC RBC-ENTMCNC: 31.6 PG
MCHC RBC-ENTMCNC: 33.3 GM/DL
MCV RBC AUTO: 94.7 FL
MONOCYTES # BLD AUTO: 0.42 K/UL
MONOCYTES NFR BLD AUTO: 6.3 %
NEUTROPHILS # BLD AUTO: 4.64 K/UL
NEUTROPHILS NFR BLD AUTO: 69.8 %
PLATELET # BLD AUTO: 257 K/UL
POTASSIUM SERPL-SCNC: 4 MMOL/L
PROT SERPL-MCNC: 5.9 G/DL
PROT UR-MCNC: 28 MG/DL
RBC # BLD: 3.93 M/UL
RBC # FLD: 13 %
SODIUM SERPL-SCNC: 140 MMOL/L
URATE SERPL-MCNC: 2.7 MG/DL
WBC # FLD AUTO: 6.64 K/UL

## 2024-10-29 DIAGNOSIS — Z3A.33 33 WEEKS GESTATION OF PREGNANCY: ICD-10-CM

## 2024-10-29 DIAGNOSIS — E66.812 OBESITY, CLASS 2: ICD-10-CM

## 2024-10-29 DIAGNOSIS — R03.0 ELEVATED BLOOD-PRESSURE READING, WITHOUT DIAGNOSIS OF HYPERTENSION: ICD-10-CM

## 2024-10-29 DIAGNOSIS — Z34.90 ENCOUNTER FOR SUPERVISION OF NORMAL PREGNANCY, UNSPECIFIED, UNSPECIFIED TRIMESTER: ICD-10-CM

## 2024-11-01 ENCOUNTER — OUTPATIENT (OUTPATIENT)
Dept: OUTPATIENT SERVICES | Facility: HOSPITAL | Age: 29
LOS: 1 days | End: 2024-11-01
Payer: MEDICAID

## 2024-11-01 ENCOUNTER — APPOINTMENT (OUTPATIENT)
Dept: OBGYN | Facility: CLINIC | Age: 29
End: 2024-11-01
Payer: MEDICAID

## 2024-11-01 VITALS
HEIGHT: 65 IN | TEMPERATURE: 96.4 F | DIASTOLIC BLOOD PRESSURE: 87 MMHG | SYSTOLIC BLOOD PRESSURE: 125 MMHG | OXYGEN SATURATION: 99 % | WEIGHT: 234 LBS | RESPIRATION RATE: 18 BRPM | HEART RATE: 98 BPM | BODY MASS INDEX: 38.99 KG/M2

## 2024-11-01 VITALS — SYSTOLIC BLOOD PRESSURE: 122 MMHG | DIASTOLIC BLOOD PRESSURE: 81 MMHG

## 2024-11-01 DIAGNOSIS — R03.0 ELEVATED BLOOD-PRESSURE READING, W/OUT DIAGNOSIS OF HYPERTENSION: ICD-10-CM

## 2024-11-01 DIAGNOSIS — Z34.00 ENCOUNTER FOR SUPERVISION OF NORMAL FIRST PREGNANCY, UNSPECIFIED TRIMESTER: ICD-10-CM

## 2024-11-01 PROCEDURE — 82570 ASSAY OF URINE CREATININE: CPT

## 2024-11-01 PROCEDURE — 99213 OFFICE O/P EST LOW 20 MIN: CPT

## 2024-11-01 PROCEDURE — 84550 ASSAY OF BLOOD/URIC ACID: CPT

## 2024-11-01 PROCEDURE — 36415 COLL VENOUS BLD VENIPUNCTURE: CPT

## 2024-11-01 PROCEDURE — 85025 COMPLETE CBC W/AUTO DIFF WBC: CPT

## 2024-11-01 PROCEDURE — 84156 ASSAY OF PROTEIN URINE: CPT

## 2024-11-01 PROCEDURE — G0463: CPT

## 2024-11-01 PROCEDURE — 81003 URINALYSIS AUTO W/O SCOPE: CPT

## 2024-11-01 PROCEDURE — 80053 COMPREHEN METABOLIC PANEL: CPT

## 2024-11-04 DIAGNOSIS — Z3A.34 34 WEEKS GESTATION OF PREGNANCY: ICD-10-CM

## 2024-11-04 DIAGNOSIS — R03.0 ELEVATED BLOOD-PRESSURE READING, WITHOUT DIAGNOSIS OF HYPERTENSION: ICD-10-CM

## 2024-11-04 DIAGNOSIS — Z34.93 ENCOUNTER FOR SUPERVISION OF NORMAL PREGNANCY, UNSPECIFIED, THIRD TRIMESTER: ICD-10-CM

## 2024-11-04 LAB
ALBUMIN SERPL ELPH-MCNC: 3.3 G/DL
ALP BLD-CCNC: 90 U/L
ALT SERPL-CCNC: 14 U/L
ANION GAP SERPL CALC-SCNC: 13 MMOL/L
AST SERPL-CCNC: 13 U/L
BASOPHILS # BLD AUTO: 0.01 K/UL
BASOPHILS NFR BLD AUTO: 0.2 %
BILIRUB SERPL-MCNC: 0.3 MG/DL
BUN SERPL-MCNC: 6 MG/DL
CALCIUM SERPL-MCNC: 8.9 MG/DL
CHLORIDE SERPL-SCNC: 105 MMOL/L
CO2 SERPL-SCNC: 19 MMOL/L
CREAT SERPL-MCNC: 0.63 MG/DL
CREAT SPEC-SCNC: 118 MG/DL
CREAT/PROT UR: 0.2 RATIO
EGFR: 123 ML/MIN/1.73M2
EOSINOPHIL # BLD AUTO: 0.05 K/UL
EOSINOPHIL NFR BLD AUTO: 0.8 %
GLUCOSE SERPL-MCNC: 111 MG/DL
HCT VFR BLD CALC: 36.8 %
HGB BLD-MCNC: 12.5 G/DL
IMM GRANULOCYTES NFR BLD AUTO: 0.5 %
LYMPHOCYTES # BLD AUTO: 1.41 K/UL
LYMPHOCYTES NFR BLD AUTO: 21.9 %
MAN DIFF?: NORMAL
MCHC RBC-ENTMCNC: 31.3 PG
MCHC RBC-ENTMCNC: 34 G/DL
MCV RBC AUTO: 92 FL
MONOCYTES # BLD AUTO: 0.48 K/UL
MONOCYTES NFR BLD AUTO: 7.5 %
NEUTROPHILS # BLD AUTO: 4.45 K/UL
NEUTROPHILS NFR BLD AUTO: 69.1 %
PLATELET # BLD AUTO: 235 K/UL
POTASSIUM SERPL-SCNC: 3.7 MMOL/L
PROT SERPL-MCNC: 6.1 G/DL
PROT UR-MCNC: 17 MG/DL
RBC # BLD: 4 M/UL
RBC # FLD: 13 %
SODIUM SERPL-SCNC: 138 MMOL/L
URATE SERPL-MCNC: 3 MG/DL
WBC # FLD AUTO: 6.43 K/UL

## 2024-11-08 ENCOUNTER — OUTPATIENT (OUTPATIENT)
Dept: OUTPATIENT SERVICES | Facility: HOSPITAL | Age: 29
LOS: 1 days | End: 2024-11-08
Payer: MEDICAID

## 2024-11-08 ENCOUNTER — INPATIENT (INPATIENT)
Facility: HOSPITAL | Age: 29
LOS: 0 days | Discharge: HOME | DRG: 833 | End: 2024-11-09
Attending: OBSTETRICS & GYNECOLOGY | Admitting: OBSTETRICS & GYNECOLOGY
Payer: MEDICAID

## 2024-11-08 ENCOUNTER — APPOINTMENT (OUTPATIENT)
Dept: OBGYN | Facility: CLINIC | Age: 29
End: 2024-11-08
Payer: MEDICAID

## 2024-11-08 VITALS
HEART RATE: 104 BPM | RESPIRATION RATE: 18 BRPM | TEMPERATURE: 97 F | HEIGHT: 65 IN | WEIGHT: 233 LBS | OXYGEN SATURATION: 98 % | DIASTOLIC BLOOD PRESSURE: 85 MMHG | BODY MASS INDEX: 38.82 KG/M2 | SYSTOLIC BLOOD PRESSURE: 132 MMHG

## 2024-11-08 VITALS
SYSTOLIC BLOOD PRESSURE: 150 MMHG | HEART RATE: 80 BPM | RESPIRATION RATE: 18 BRPM | TEMPERATURE: 98 F | OXYGEN SATURATION: 99 % | WEIGHT: 233.03 LBS | DIASTOLIC BLOOD PRESSURE: 91 MMHG | HEIGHT: 64 IN

## 2024-11-08 VITALS
SYSTOLIC BLOOD PRESSURE: 136 MMHG | HEART RATE: 85 BPM | DIASTOLIC BLOOD PRESSURE: 75 MMHG | TEMPERATURE: 98 F | OXYGEN SATURATION: 96 % | RESPIRATION RATE: 18 BRPM

## 2024-11-08 VITALS — SYSTOLIC BLOOD PRESSURE: 130 MMHG | DIASTOLIC BLOOD PRESSURE: 96 MMHG

## 2024-11-08 DIAGNOSIS — Z34.80 ENCOUNTER FOR SUPERVISION OF OTHER NORMAL PREGNANCY, UNSPECIFIED TRIMESTER: ICD-10-CM

## 2024-11-08 DIAGNOSIS — Z98.890 OTHER SPECIFIED POSTPROCEDURAL STATES: Chronic | ICD-10-CM

## 2024-11-08 DIAGNOSIS — E66.812 OBESITY, CLASS 2: ICD-10-CM

## 2024-11-08 DIAGNOSIS — R03.0 ELEVATED BLOOD-PRESSURE READING, W/OUT DIAGNOSIS OF HYPERTENSION: ICD-10-CM

## 2024-11-08 DIAGNOSIS — Z34.00 ENCOUNTER FOR SUPERVISION OF NORMAL FIRST PREGNANCY, UNSPECIFIED TRIMESTER: ICD-10-CM

## 2024-11-08 DIAGNOSIS — O26.899 OTHER SPECIFIED PREGNANCY RELATED CONDITIONS, UNSPECIFIED TRIMESTER: ICD-10-CM

## 2024-11-08 LAB
ALBUMIN SERPL ELPH-MCNC: 2.5 G/DL — LOW (ref 3.5–5)
ALP SERPL-CCNC: 99 U/L — SIGNIFICANT CHANGE UP (ref 40–120)
ALT FLD-CCNC: 17 U/L DA — SIGNIFICANT CHANGE UP (ref 10–60)
ANION GAP SERPL CALC-SCNC: 6 MMOL/L — SIGNIFICANT CHANGE UP (ref 5–17)
APPEARANCE UR: ABNORMAL
APTT BLD: 29.2 SEC — SIGNIFICANT CHANGE UP (ref 24.5–35.6)
AST SERPL-CCNC: 16 U/L — SIGNIFICANT CHANGE UP (ref 10–40)
BASOPHILS # BLD AUTO: 0.02 K/UL — SIGNIFICANT CHANGE UP (ref 0–0.2)
BASOPHILS NFR BLD AUTO: 0.3 % — SIGNIFICANT CHANGE UP (ref 0–2)
BILIRUB SERPL-MCNC: 0.4 MG/DL — SIGNIFICANT CHANGE UP (ref 0.2–1.2)
BILIRUB UR-MCNC: NEGATIVE — SIGNIFICANT CHANGE UP
BLD GP AB SCN SERPL QL: SIGNIFICANT CHANGE UP
BUN SERPL-MCNC: 6 MG/DL — LOW (ref 7–18)
CALCIUM SERPL-MCNC: 9.3 MG/DL — SIGNIFICANT CHANGE UP (ref 8.4–10.5)
CHLORIDE SERPL-SCNC: 112 MMOL/L — HIGH (ref 96–108)
CO2 SERPL-SCNC: 22 MMOL/L — SIGNIFICANT CHANGE UP (ref 22–31)
COLOR SPEC: YELLOW — SIGNIFICANT CHANGE UP
CREAT ?TM UR-MCNC: 20 MG/DL — SIGNIFICANT CHANGE UP
CREAT SERPL-MCNC: 0.68 MG/DL — SIGNIFICANT CHANGE UP (ref 0.5–1.3)
DIFF PNL FLD: NEGATIVE — SIGNIFICANT CHANGE UP
EGFR: 121 ML/MIN/1.73M2 — SIGNIFICANT CHANGE UP
EOSINOPHIL # BLD AUTO: 0.03 K/UL — SIGNIFICANT CHANGE UP (ref 0–0.5)
EOSINOPHIL NFR BLD AUTO: 0.4 % — SIGNIFICANT CHANGE UP (ref 0–6)
FIBRINOGEN PPP-MCNC: 415 MG/DL — SIGNIFICANT CHANGE UP (ref 200–475)
GLUCOSE SERPL-MCNC: 72 MG/DL — SIGNIFICANT CHANGE UP (ref 70–99)
GLUCOSE UR QL: NEGATIVE MG/DL — SIGNIFICANT CHANGE UP
HCT VFR BLD CALC: 35.5 % — SIGNIFICANT CHANGE UP (ref 34.5–45)
HGB BLD-MCNC: 12.6 G/DL — SIGNIFICANT CHANGE UP (ref 11.5–15.5)
HIV 1 & 2 AB SERPL IA.RAPID: SIGNIFICANT CHANGE UP
IMM GRANULOCYTES NFR BLD AUTO: 0.4 % — SIGNIFICANT CHANGE UP (ref 0–0.9)
INR BLD: 0.94 RATIO — SIGNIFICANT CHANGE UP (ref 0.85–1.16)
KETONES UR-MCNC: NEGATIVE MG/DL — SIGNIFICANT CHANGE UP
LDH SERPL L TO P-CCNC: 148 U/L — SIGNIFICANT CHANGE UP (ref 120–225)
LEUKOCYTE ESTERASE UR-ACNC: NEGATIVE — SIGNIFICANT CHANGE UP
LYMPHOCYTES # BLD AUTO: 1.51 K/UL — SIGNIFICANT CHANGE UP (ref 1–3.3)
LYMPHOCYTES # BLD AUTO: 22 % — SIGNIFICANT CHANGE UP (ref 13–44)
MCHC RBC-ENTMCNC: 32.5 PG — SIGNIFICANT CHANGE UP (ref 27–34)
MCHC RBC-ENTMCNC: 35.5 G/DL — SIGNIFICANT CHANGE UP (ref 32–36)
MCV RBC AUTO: 91.5 FL — SIGNIFICANT CHANGE UP (ref 80–100)
MONOCYTES # BLD AUTO: 0.7 K/UL — SIGNIFICANT CHANGE UP (ref 0–0.9)
MONOCYTES NFR BLD AUTO: 10.2 % — SIGNIFICANT CHANGE UP (ref 2–14)
NEUTROPHILS # BLD AUTO: 4.58 K/UL — SIGNIFICANT CHANGE UP (ref 1.8–7.4)
NEUTROPHILS NFR BLD AUTO: 66.7 % — SIGNIFICANT CHANGE UP (ref 43–77)
NITRITE UR-MCNC: NEGATIVE — SIGNIFICANT CHANGE UP
NRBC # BLD: 0 /100 WBCS — SIGNIFICANT CHANGE UP (ref 0–0)
PH UR: 7 — SIGNIFICANT CHANGE UP (ref 5–8)
PLATELET # BLD AUTO: 212 K/UL — SIGNIFICANT CHANGE UP (ref 150–400)
POTASSIUM SERPL-MCNC: 3.9 MMOL/L — SIGNIFICANT CHANGE UP (ref 3.5–5.3)
POTASSIUM SERPL-SCNC: 3.9 MMOL/L — SIGNIFICANT CHANGE UP (ref 3.5–5.3)
PROT ?TM UR-MCNC: 7 MG/DL — SIGNIFICANT CHANGE UP (ref 0–12)
PROT SERPL-MCNC: 6.4 G/DL — SIGNIFICANT CHANGE UP (ref 6–8.3)
PROT UR-MCNC: NEGATIVE MG/DL — SIGNIFICANT CHANGE UP
PROT/CREAT UR-RTO: 0.4 RATIO — HIGH (ref 0–0.2)
PROTHROM AB SERPL-ACNC: 11 SEC — SIGNIFICANT CHANGE UP (ref 9.9–13.4)
RBC # BLD: 3.88 M/UL — SIGNIFICANT CHANGE UP (ref 3.8–5.2)
RBC # FLD: 12.8 % — SIGNIFICANT CHANGE UP (ref 10.3–14.5)
SODIUM SERPL-SCNC: 140 MMOL/L — SIGNIFICANT CHANGE UP (ref 135–145)
SP GR SPEC: 1 — LOW (ref 1–1.03)
URATE SERPL-MCNC: 3.1 MG/DL — SIGNIFICANT CHANGE UP (ref 2.5–7)
UROBILINOGEN FLD QL: 0.2 MG/DL — SIGNIFICANT CHANGE UP (ref 0.2–1)
WBC # BLD: 6.87 K/UL — SIGNIFICANT CHANGE UP (ref 3.8–10.5)
WBC # FLD AUTO: 6.87 K/UL — SIGNIFICANT CHANGE UP (ref 3.8–10.5)

## 2024-11-08 PROCEDURE — G0463: CPT

## 2024-11-08 PROCEDURE — 76818 FETAL BIOPHYS PROFILE W/NST: CPT | Mod: 26

## 2024-11-08 PROCEDURE — 99213 OFFICE O/P EST LOW 20 MIN: CPT

## 2024-11-08 PROCEDURE — 81003 URINALYSIS AUTO W/O SCOPE: CPT

## 2024-11-08 PROCEDURE — 76815 OB US LIMITED FETUS(S): CPT | Mod: 26

## 2024-11-08 RX ORDER — FERROUS SULFATE 325(65) MG
325 TABLET ORAL DAILY
Refills: 0 | Status: DISCONTINUED | OUTPATIENT
Start: 2024-11-08 | End: 2024-11-09

## 2024-11-08 RX ORDER — PRENATAL VIT/IRON FUM/FOLIC AC 60 MG-1 MG
1 TABLET ORAL DAILY
Refills: 0 | Status: DISCONTINUED | OUTPATIENT
Start: 2024-11-08 | End: 2024-11-09

## 2024-11-08 RX ORDER — FOLIC ACID 1 MG/1
1 TABLET ORAL DAILY
Refills: 0 | Status: DISCONTINUED | OUTPATIENT
Start: 2024-11-08 | End: 2024-11-09

## 2024-11-08 NOTE — OB PROVIDER H&P - NSHPPHYSICALEXAM_GEN_ALL_CORE
Vital Signs Last 24 Hrs  T(C): 36.7 (08 Nov 2024 14:16), Max: 36.7 (08 Nov 2024 14:16)  T(F): 98.1 (08 Nov 2024 14:16), Max: 98.1 (08 Nov 2024 14:16)  HR: 85 (08 Nov 2024 14:16) (85 - 85)  BP: 136/75 (08 Nov 2024 14:16) (136/75 - 136/75)  BP(mean): --  RR: 18 (08 Nov 2024 14:16) (18 - 18)  SpO2: 96% (08 Nov 2024 14:16) (96% - 96%)    Parameters below as of 08 Nov 2024 14:16  Patient On (Oxygen Delivery Method): room air  gen: well appearing, no acute distress  chest; clear to ascultation, b/l  abd: gravid, soft, non tender  sve def  DTR 2+    fh baseline 130 moderate variability +accels  toco q irritability

## 2024-11-08 NOTE — OB RN TRIAGE NOTE - NS_OBGYNHISTORY_OBGYN_ALL_OB_FT
TOP W/Pills 5wks 2010  TOP W/ D&C 2011  Hemorrhoidectomy 2022  Hx of Anxiety, pt previously on meds, last seen therapist in 2021/no longer taking medication

## 2024-11-08 NOTE — OB PROVIDER TRIAGE NOTE - HISTORY OF PRESENT ILLNESS
30 yo  @ 35w3d presents to triage as instructed by VIPIN Foy for r/o pih.  Patient with no acute complaints. state she is doing well.  Patient admits to fetal movement  denies vaginal bleeding, leakage of fluid or contractions pain  denies headache, blurry vision or epigastric pain  pnc [facility] with , uncomplicated per patient  pobhx  medical top   top wD+C . both 1 trimester  pgynhx: lmp 3/5/2024 denies abn pap, fibroids, cyst or stds  pmedhx:  anxiety  previously on  meds  psurghx:  hemmrhoidectomy  allergies: nkda  social:  denies smoking, alcohol use or recreational drug use  28 yo  @ 35w3d presents to triage as instructed by VIPIN Foy for r/o pih.  Patient with no acute complaints. state she is doing well.  Patient admits to fetal movement  denies vaginal bleeding, leakage of fluid or contractions pain  denies headache, blurry vision or epigastric pain  pnc [facility] with , uncomplicated per patient  pobhx  medical top   top wD+C . both 1 trimester  pgynhx: lmp 3/5/2024 denies abn pap, fibroids, cyst or stds  pmedhx:  anxiety  previously on  meds  psurghx:  hemorrhoidectomy  allergies: nkda  social:  denies smoking, alcohol use or recreational drug use

## 2024-11-08 NOTE — OB PROVIDER LABOR PROGRESS NOTE - ASSESSMENT
28yo  siup at 35 3/7 weeks sent from [facility] due to elevated bp, diagnosed with PEC w/o sf with labile bp's 140-150/, patient is asymptomatic   HELLP labs negative, pc ratio 0.4   bpp /8, marlena: 11, efw: 2708g, cephalic     Discussed patient at [facility] rounds with Dr. Meadows and Dr. Jacobs   agreed with 24 hour urine collection  28yo  siup at 35 3/7 weeks sent from [facility] due to elevated bp, diagnosed with PEC w/o sf with labile bp's 140-150/, patient is asymptomatic   HELLP labs negative, pc ratio 0.4   bpp 8/8, marlena: 11, efw: 2708g, cephalic     Discussed patient at [facility] rounds with Dr. Meadows and Dr. Jacobs   - recommended admission for BP monitoring,   - 24 hour urine protein evaluation    - advised to offer betamethasone to patient and discuss benefits vs. risks of betamethasone in late  period   - Patient to be induced with severe range BPs, or signs/symptoms of severe preeclampsia

## 2024-11-08 NOTE — OB PROVIDER H&P - ASSESSMENT
30yo  siup at 35 3/7 weeks sent from [facility] due to elevated bp    dx: PEC w/o sf with labile bp's 140-150/    asymp    hellp labs negative    pc ratio 0.4     bpp  marlena: 11    efw: 2708g cephalic     - admit per dr lovell observation     - bp q20min     - nst continuous    - MFM consult/ melissa consult

## 2024-11-08 NOTE — OB PROVIDER H&P - NSFIRSTDATEVISIT_OBGYN_ALL_OB
Crista Fleming, anesthesia at bedside speaking with pt. MD will complete sign out and okay to transfer to floor.   
Unknown

## 2024-11-08 NOTE — OB PROVIDER H&P - IN ACCORDANCE WITH [place] STATE LAW, WE OFFER EVERY PATIENT A HEPATITIS C TEST. WOULD YOU LIKE TO BE TESTED TODAY?
The Service to Podiatry order in workqueue [id number] requested on 6/26/2018 has been deferred for 7 days as we have been unable to contact the patient.  We will be sending a letter communication to the patient and will remove the order within 7 days.  Please contact the patient if further communication is needed.  As a reminder, this order can be reactivated at any time and made available again for patient scheduling.     Opt out

## 2024-11-08 NOTE — OB RN TRIAGE NOTE - FALL HARM RISK - UNIVERSAL INTERVENTIONS
Bed in lowest position, wheels locked, appropriate side rails in place/Call bell, personal items and telephone in reach/Instruct patient to call for assistance before getting out of bed or chair/Non-slip footwear when patient is out of bed/[place] to call system/Physically safe environment - no spills, clutter or unnecessary equipment/Purposeful Proactive Rounding/Room/bathroom lighting operational, light cord in reach

## 2024-11-08 NOTE — CHART NOTE - NSCHARTNOTEFT_GEN_A_CORE
[facility] CHART NOTE - TELEPHONE CONSULT    Discussed patient's case over phone - patient not evaluated in person.     28yo  at 35w3d undergoing evaluation for preeclampsia.  Initially presented from office with mild range blood pressures.  Asymptomatic, no HA/changes in vision/RUQ pain.  BPs since presentation normal to mild range (130s-150s/70-90s).  HELLP labs within normal limits, UP:C 0.4.  She has met criteria for preeclampsia, no signs of symptoms of severe features at this time.  Fetus is cephalic, BPP 8/8, MARCO ANTONIO 11cm, AGA on US today by radiology, NST reassuring (baseline 140, mod variability, accelerations, no decelerations).     - Admit for blood pressure monitoring  - Continue 24h urine protein evaluation  - Can offer betamethasone for fetal lung maturity in late  period (benefits of mild improvement in short term pulmonary outcomes vs  hypoglycemia, unknown long term effects)  - Indications for delivery: severe range blood pressures >160/110 (and would initiate antihypertensives at this threshold), severe headache that does not resolve with tylenol, RUQ pain that does not resolve, pulmonary edema, scotoma; LFTs twice upper limit of normal, platelets <100k, or creatinine >1.1.    - No current contraindications to vaginal delivery; delivery per usual OB indications if meets criteria above    Discussed with [facility] Joseph attending  Shelbi Meadows MD PGY-6

## 2024-11-08 NOTE — OB PROVIDER TRIAGE NOTE - NSHPLABSRESULTS_GEN_ALL_CORE
12.6   6.87  )-----------( 212      ( 2024 14:30 )             35.5   11-08    140  |  112[H]  |  6[L]  ----------------------------<  72  3.9   |  22  |  0.68    Ca    9.3      2024 14:30    TPro  6.4  /  Alb  2.5[L]  /  TBili  0.4  /  DBili  x   /  AST  16  /  ALT  17  /  AlkPhos  99  11-08  Urinalysis Basic - ( 2024 14:30 )    Color: Yellow / Appearance: Cloudy / S.003 / pH: x  Gluc: 72 mg/dL / Ketone: Negative mg/dL  / Bili: Negative / Urobili: 0.2 mg/dL   Blood: x / Protein: Negative mg/dL / Nitrite: Negative   Leuk Esterase: Negative / RBC: x / WBC x   Sq Epi: x / Non Sq Epi: x / Bacteria: x 12.6   6.87  )-----------( 212      ( 2024 14:30 )             35.5   11-08    140  |  112[H]  |  6[L]  ----------------------------<  72  3.9   |  22  |  0.68    Ca    9.3      2024 14:30    TPro  6.4  /  Alb  2.5[L]  /  TBili  0.4  /  DBili  x   /  AST  16  /  ALT  17  /  AlkPhos  99  11-08  Urinalysis Basic - ( 2024 14:30 )    Color: Yellow / Appearance: Cloudy / S.003 / pH: x  Gluc: 72 mg/dL / Ketone: Negative mg/dL  / Bili: Negative / Urobili: 0.2 mg/dL   Blood: x / Protein: Negative mg/dL / Nitrite: Negative   Leuk Esterase: Negative / RBC: x / WBC x   Sq Epi: x / Non Sq Epi: x / Bacteria: x    Protein/Creatinine Ratio, Urine (11.08.24 @ 14:30)    Total Protein, Random Urine: 7 mg/dL    Protein/Creatinine Ratio Calculation: 0.4 Ratio    Creatinine, Random Urine: 20: Reference Ranges have NOT been established for random urine analytes due  to variability in fluid intake and concentration. mg/dL

## 2024-11-08 NOTE — OB PROVIDER TRIAGE NOTE - NSHPPHYSICALEXAM_GEN_ALL_CORE
Vital Signs Last 24 Hrs  T(C): 36.7 (08 Nov 2024 14:16), Max: 36.7 (08 Nov 2024 14:16)  T(F): 98.1 (08 Nov 2024 14:16), Max: 98.1 (08 Nov 2024 14:16)  HR: 85 (08 Nov 2024 14:16) (85 - 85)  BP: 136/75 (08 Nov 2024 14:16) (136/75 - 136/75)  BP(mean): --  RR: 18 (08 Nov 2024 14:16) (18 - 18)  SpO2: 96% (08 Nov 2024 14:16) (96% - 96%)    Parameters below as of 08 Nov 2024 14:16  Patient On (Oxygen Delivery Method): room air  gen: well appearing, no acute distress  chest; clear to ascultation, b/l  abd: gravid, soft, non tender  sve Vital Signs Last 24 Hrs  T(C): 36.7 (08 Nov 2024 14:16), Max: 36.7 (08 Nov 2024 14:16)  T(F): 98.1 (08 Nov 2024 14:16), Max: 98.1 (08 Nov 2024 14:16)  HR: 85 (08 Nov 2024 14:16) (85 - 85)  BP: 136/75 (08 Nov 2024 14:16) (136/75 - 136/75)  BP(mean): --  RR: 18 (08 Nov 2024 14:16) (18 - 18)  SpO2: 96% (08 Nov 2024 14:16) (96% - 96%)    Parameters below as of 08 Nov 2024 14:16  Patient On (Oxygen Delivery Method): room air  gen: well appearing, no acute distress  chest; clear to ascultation, b/l  abd: gravid, soft, non tender  sve def  DTR 2+    fh baseline 130 moderate variability +accels  toco q irritability

## 2024-11-08 NOTE — OB PROVIDER H&P - HISTORY OF PRESENT ILLNESS
admitted: dx: PEC w/o SF labile bp's for observation ; HELLP labs neg pc ratio 0.4     28 yo  @ 35w3d presents to triage as instructed by CNJIM Foy for r/o pih.  Patient with no acute complaints. state she is doing well.  Patient admits to fetal movement  denies vaginal bleeding, leakage of fluid or contractions pain  denies headache, blurry vision or epigastric pain  pnc [facility] with , uncomplicated per patient  pobhx  medical top   top wD+C . both 1 trimester  pgynhx: lmp 3/5/2024 denies abn pap, fibroids, cyst or stds  pmedhx:  anxiety  previously on  meds  psurghx:  hemorrhoidectomy  allergies: nkda  social:  denies smoking, alcohol use or recreational drug use  admitted: dx: PEC w/o SF labile bp's for observation ; HELLP labs neg pc ratio 0.4     28 yo  @ 35w3d presents to triage as instructed by CNJIM Foy for r/o pih.  Patient with no acute complaints. states she is doing well.  Patient admits to fetal movement  denies vaginal bleeding, leakage of fluid or contractions pain  denies headache, blurry vision or epigastric pain  pnc [facility] with , uncomplicated per patient  pobhx  medical top   top wD+C . both 1 trimester  pgynhx: lmp 3/5/2024 denies abn pap, fibroids, cyst or stds  pmedhx:  anxiety  previously on  meds  psurghx:  hemorrhoidectomy  allergies: nkda  social:  denies smoking, alcohol use or recreational drug use

## 2024-11-08 NOTE — OB PROVIDER TRIAGE NOTE - NSOBPROVIDERNOTE_OBGYN_ALL_OB_FT
a/p siup @ 35w3d r/o pih, asymptomatic  - pih labs  - serial bp   continue to monitor a/p siup @ 35w3d r/o pih, asymptomatic  - [facility] labs  - serial bp   continue to monitor    addendum 5pm  labs and bps  reviewed with peyton Boykin attending  136/75, 150/92, 153/88, 141/89, 141/90, 138/100  [facility] labs neg, p/c 0.4  precclampsia without severe fx,   continue to monitor and cycle bps

## 2024-11-08 NOTE — OB PROVIDER H&P - NSHPLABSRESULTS_GEN_ALL_CORE
12.6   6.87  )-----------( 212      ( 2024 14:30 )             35.5   11-    140  |  112[H]  |  6[L]  ----------------------------<  72  3.9   |  22  |  0.68    Ca    9.3      2024 14:30    TPro  6.4  /  Alb  2.5[L]  /  TBili  0.4  /  DBili  x   /  AST  16  /  ALT  17  /  AlkPhos  99    Urinalysis Basic - ( 2024 14:30 )    Color: Yellow / Appearance: Cloudy / S.003 / pH: x  Gluc: 72 mg/dL / Ketone: Negative mg/dL  / Bili: Negative / Urobili: 0.2 mg/dL   Blood: x / Protein: Negative mg/dL / Nitrite: Negative   Leuk Esterase: Negative / RBC: x / WBC x   Sq Epi: x / Non Sq Epi: x / Bacteria: x    Protein/Creatinine Ratio, Urine (24 @ 14:30)    Total Protein, Random Urine: 7 mg/dL    Protein/Creatinine Ratio Calculation: 0.4 Ratio    Creatinine, Random Urine: 20: Reference Ranges have NOT been established for random urine analytes due  to variability in fluid intake and concentration. mg/dL    < from: US OB Fetus Limited (24 @ 16:22) >    GESTATIONAL AGE: 35 weeks 3 days.    COMPARISON: None available.    TECHNIQUE:  Transabdominal pelvic sonogram only.    FINDINGS:    Single live Intrauterine gestation is present.  Fetal position: CEP presentation.  Placenta location: ANT.  Amniotic fluid volume: MARCO ANTONIO 11.5 cm.  Cervical length: Not visualized by transabdominal technique.  Fetal motion is seen in real-time and the fetal heart rate measures 135   bpm bpm.    Fetal anatomy and umbilical cord were not evaluated.    Measurements are as follows:    BPD: 9.1 cm  corresponding to 36 weeks, 6 days.  HC: 32.5 cm corresponding to 36 weeks, 5 days.  AC: 30.9 cm corresponding to 34 weeks, 6 days.  FL: 7.0 cm corresponding to 36 weeks, 0 days.    Estimated fetal weight: 2708 g,   6lbs 0ozs    Estimated fetal weight percent: 52.2%.    The biophysical profile is 8/8.    Body Movement: 2  (score 2) Greater or equal than 3 body/limb movements  (score 0) Less than 3 body/limb movements    Tone: 2  (score 2) One or more episodes extension/flexion  (score 0) No episodes extension/flexion    Breathing movements: 2  (score 2) Any breathing movements or hiccups  (score 0) No breathing movements    Amniotic fluid: 2  (score 2)One pocket >= 2cm in 2 perpendicular planes  (score 0) <2 X 2 cm pocket    Additional comments: None.    IMPRESSION:  Single live intrauterine pregnancy with a biophysical profile score of   8/8.  Estimated gestational age is 36 weeks 1 day.  Estimated due date is 2024.    Please note that this study was not optimized for the evaluation of fetal  anatomy and umbilical cord which should be performed on a separate basis   as clinically indicated.

## 2024-11-09 ENCOUNTER — TRANSCRIPTION ENCOUNTER (OUTPATIENT)
Age: 29
End: 2024-11-09

## 2024-11-09 LAB
ALBUMIN SERPL ELPH-MCNC: 2.4 G/DL — LOW (ref 3.5–5)
ALP SERPL-CCNC: 98 U/L — SIGNIFICANT CHANGE UP (ref 40–120)
ALT FLD-CCNC: 17 U/L DA — SIGNIFICANT CHANGE UP (ref 10–60)
ANION GAP SERPL CALC-SCNC: 5 MMOL/L — SIGNIFICANT CHANGE UP (ref 5–17)
APTT BLD: 29.3 SEC — SIGNIFICANT CHANGE UP (ref 24.5–35.6)
AST SERPL-CCNC: 13 U/L — SIGNIFICANT CHANGE UP (ref 10–40)
BASOPHILS # BLD AUTO: 0 K/UL — SIGNIFICANT CHANGE UP (ref 0–0.2)
BASOPHILS NFR BLD AUTO: 0 % — SIGNIFICANT CHANGE UP (ref 0–2)
BILIRUB SERPL-MCNC: 0.6 MG/DL — SIGNIFICANT CHANGE UP (ref 0.2–1.2)
BUN SERPL-MCNC: 6 MG/DL — LOW (ref 7–18)
CALCIUM SERPL-MCNC: 8.5 MG/DL — SIGNIFICANT CHANGE UP (ref 8.4–10.5)
CHLORIDE SERPL-SCNC: 111 MMOL/L — HIGH (ref 96–108)
CO2 SERPL-SCNC: 23 MMOL/L — SIGNIFICANT CHANGE UP (ref 22–31)
CREAT SERPL-MCNC: 0.72 MG/DL — SIGNIFICANT CHANGE UP (ref 0.5–1.3)
EGFR: 116 ML/MIN/1.73M2 — SIGNIFICANT CHANGE UP
EOSINOPHIL # BLD AUTO: 0.05 K/UL — SIGNIFICANT CHANGE UP (ref 0–0.5)
EOSINOPHIL NFR BLD AUTO: 0.7 % — SIGNIFICANT CHANGE UP (ref 0–6)
FIBRINOGEN PPP-MCNC: 378 MG/DL — SIGNIFICANT CHANGE UP (ref 200–475)
GLUCOSE SERPL-MCNC: 77 MG/DL — SIGNIFICANT CHANGE UP (ref 70–99)
HCT VFR BLD CALC: 35.2 % — SIGNIFICANT CHANGE UP (ref 34.5–45)
HGB BLD-MCNC: 12.6 G/DL — SIGNIFICANT CHANGE UP (ref 11.5–15.5)
HIV 1+2 AB+HIV1 P24 AG SERPL QL IA: SIGNIFICANT CHANGE UP
IMM GRANULOCYTES NFR BLD AUTO: 0.8 % — SIGNIFICANT CHANGE UP (ref 0–0.9)
INR BLD: 0.96 RATIO — SIGNIFICANT CHANGE UP (ref 0.85–1.16)
LDH SERPL L TO P-CCNC: 150 U/L — SIGNIFICANT CHANGE UP (ref 120–225)
LYMPHOCYTES # BLD AUTO: 2.27 K/UL — SIGNIFICANT CHANGE UP (ref 1–3.3)
LYMPHOCYTES # BLD AUTO: 31.5 % — SIGNIFICANT CHANGE UP (ref 13–44)
MCHC RBC-ENTMCNC: 33 PG — SIGNIFICANT CHANGE UP (ref 27–34)
MCHC RBC-ENTMCNC: 35.8 G/DL — SIGNIFICANT CHANGE UP (ref 32–36)
MCV RBC AUTO: 92.1 FL — SIGNIFICANT CHANGE UP (ref 80–100)
MONOCYTES # BLD AUTO: 0.69 K/UL — SIGNIFICANT CHANGE UP (ref 0–0.9)
MONOCYTES NFR BLD AUTO: 9.6 % — SIGNIFICANT CHANGE UP (ref 2–14)
NEUTROPHILS # BLD AUTO: 4.13 K/UL — SIGNIFICANT CHANGE UP (ref 1.8–7.4)
NEUTROPHILS NFR BLD AUTO: 57.4 % — SIGNIFICANT CHANGE UP (ref 43–77)
NRBC # BLD: 0 /100 WBCS — SIGNIFICANT CHANGE UP (ref 0–0)
PLATELET # BLD AUTO: 201 K/UL — SIGNIFICANT CHANGE UP (ref 150–400)
POTASSIUM SERPL-MCNC: 3.6 MMOL/L — SIGNIFICANT CHANGE UP (ref 3.5–5.3)
POTASSIUM SERPL-SCNC: 3.6 MMOL/L — SIGNIFICANT CHANGE UP (ref 3.5–5.3)
PROT SERPL-MCNC: 6.1 G/DL — SIGNIFICANT CHANGE UP (ref 6–8.3)
PROTHROM AB SERPL-ACNC: 11.1 SEC — SIGNIFICANT CHANGE UP (ref 9.9–13.4)
RBC # BLD: 3.82 M/UL — SIGNIFICANT CHANGE UP (ref 3.8–5.2)
RBC # FLD: 12.9 % — SIGNIFICANT CHANGE UP (ref 10.3–14.5)
RPR SER-TITR: SIGNIFICANT CHANGE UP
RPR SERPL-ACNC: SIGNIFICANT CHANGE UP
SODIUM SERPL-SCNC: 139 MMOL/L — SIGNIFICANT CHANGE UP (ref 135–145)
T PALLIDUM AB TITR SER: POSITIVE
T PALLIDUM IGG SER QL IF: REACTIVE
URATE SERPL-MCNC: 3.1 MG/DL — SIGNIFICANT CHANGE UP (ref 2.5–7)
WBC # BLD: 7.2 K/UL — SIGNIFICANT CHANGE UP (ref 3.8–10.5)
WBC # FLD AUTO: 7.2 K/UL — SIGNIFICANT CHANGE UP (ref 3.8–10.5)

## 2024-11-09 PROCEDURE — 84550 ASSAY OF BLOOD/URIC ACID: CPT

## 2024-11-09 PROCEDURE — 83615 LACTATE (LD) (LDH) ENZYME: CPT

## 2024-11-09 PROCEDURE — 76818 FETAL BIOPHYS PROFILE W/NST: CPT

## 2024-11-09 PROCEDURE — 85025 COMPLETE CBC W/AUTO DIFF WBC: CPT

## 2024-11-09 PROCEDURE — 81001 URINALYSIS AUTO W/SCOPE: CPT

## 2024-11-09 PROCEDURE — 82570 ASSAY OF URINE CREATININE: CPT

## 2024-11-09 PROCEDURE — 86593 SYPHILIS TEST NON-TREP QUANT: CPT

## 2024-11-09 PROCEDURE — 86703 HIV-1/HIV-2 1 RESULT ANTBDY: CPT

## 2024-11-09 PROCEDURE — 76815 OB US LIMITED FETUS(S): CPT

## 2024-11-09 PROCEDURE — 85610 PROTHROMBIN TIME: CPT

## 2024-11-09 PROCEDURE — 86850 RBC ANTIBODY SCREEN: CPT

## 2024-11-09 PROCEDURE — 80053 COMPREHEN METABOLIC PANEL: CPT

## 2024-11-09 PROCEDURE — 86900 BLOOD TYPING SEROLOGIC ABO: CPT

## 2024-11-09 PROCEDURE — 86901 BLOOD TYPING SEROLOGIC RH(D): CPT

## 2024-11-09 PROCEDURE — 84156 ASSAY OF PROTEIN URINE: CPT

## 2024-11-09 PROCEDURE — 36415 COLL VENOUS BLD VENIPUNCTURE: CPT

## 2024-11-09 PROCEDURE — 99238 HOSP IP/OBS DSCHRG MGMT 30/<: CPT

## 2024-11-09 PROCEDURE — 87389 HIV-1 AG W/HIV-1&-2 AB AG IA: CPT

## 2024-11-09 PROCEDURE — 86780 TREPONEMA PALLIDUM: CPT

## 2024-11-09 PROCEDURE — 86592 SYPHILIS TEST NON-TREP QUAL: CPT

## 2024-11-09 PROCEDURE — 85730 THROMBOPLASTIN TIME PARTIAL: CPT

## 2024-11-09 PROCEDURE — 85384 FIBRINOGEN ACTIVITY: CPT

## 2024-11-09 RX ORDER — PRENATAL VIT/IRON FUM/FOLIC AC 60 MG-1 MG
1 TABLET ORAL
Qty: 0 | Refills: 0 | DISCHARGE
Start: 2024-11-09

## 2024-11-09 NOTE — DISCHARGE NOTE ANTEPARTUM - HOSPITAL COURSE
Patient presents for observation for pre-eclampsia without features. Stable for discharge with no severe features.

## 2024-11-09 NOTE — PROGRESS NOTE ADULT - SUBJECTIVE AND OBJECTIVE BOX
Patient seen resting comfortably.  No current complaints.  Denies HA, CP, SOB, N/V/D, dizziness, palpitations, abdominal pain, vaginal bleeding, or any other concerns.  Ambulating and voiding without difficulty.  Passing flatus and tolerating regular diet.     Vital Signs Last 24 Hrs  T(C): 36.9 (2024 18:24), Max: 36.9 (2024 18:24)  T(F): 98.4 (2024 18:24), Max: 98.4 (2024 18:24)  HR: 80 (2024 18:24) (80 - 85)  BP: 150/91 (2024 18:24) (136/75 - 150/91)  RR: 18 (2024 18:24) (18 - 18)  SpO2: 99% (2024 18:24) (96% - 99%)    Parameters below as of 2024 18:24  Patient On (Oxygen Delivery Method): room air        Physical Exam:     Gen: A&Ox 3, NAD  Chest: CTABL  Cardiac: S1+S2+ RRR  Breast: Soft, nontender, nonengorged  Abdomen: Soft, nontender,gravid uterus, +BS  Gyn: No bleeding  Extremities: Nontender, DTRS 2+, no worsening edema, venodynes intact bilaterally                          12.6   7.20  )-----------( 201      ( 2024 06:08 )             35.2       A/P:  29 year old  at 35w4d admitted for observation for pre-eclampsia without features.  Overnight, no severe range blood pressures and repeat labs stable.  No symptoms.  Discussed case with Dr Abdiel HUTCHINSON and plan to discharge home with strict precautions    -Discharge home with strict precautions  -Report to [facility] office outpatient 2x weekly for NST/BPP  -Weekly labs  -Blood pressure monitoring at home  -Delivery at 37wks    Attending aware

## 2024-11-09 NOTE — OB PROVIDER LABOR PROGRESS NOTE - NS_SUBJECTIVE/OBJECTIVE_OBGYN_ALL_OB_FT
Patient evaluated at bedside   Resting comfortably   Continues to be asymptomatic     Collecting urine for 24 hour urine protein evaluation   Blood pressure monitoring   NST category I   multiple areas with loss of contact due to patient movement   No contractions     Blood pressures appear to be improving   123/66 @ 2356  120/66 @ 2241  133/72 @2210   135/68 @2140   136/91 @2102    Please see cpn for all blood pressures     repeat PIH labs in am   Dr. Landeros aware
Patient evaluated at bedside   Resting comfortably   Continues to have elevated blood pressures, however not in severe range   Denies headache, blurry vision, epigastric pain    24 hour urine collection initiated at 1900
Patient has no complaints at this time    Collecting urine for 24 hour urine protein evaluation    Continue BP monitoring    123/66 @2356  119/60 @ 0056  122/67 @ 0157  118/62 @ 0256  133/88 @ 0420  131/78 @ 9297

## 2024-11-09 NOTE — DISCHARGE NOTE ANTEPARTUM - MEDICATION SUMMARY - MEDICATIONS TO TAKE
I will START or STAY ON the medications listed below when I get home from the hospital:    Blood pressure cuff kit  -- Take blood pressure 2-3 times daily.  Call MD or report to ER with blood pressures persistently over 150/90  -- Indication: For pre-eclampsia     Prenatal Multivitamins with Folic Acid 1 mg oral tablet  -- 1 tab(s) by mouth once a day  -- Indication: For pregnancy

## 2024-11-09 NOTE — DISCHARGE NOTE ANTEPARTUM - CARE PROVIDER_API CALL
Katharine Crespo  Obstetrics and Gynecology  [address] [place] [address]  Phone: [phone]  Fax: [phone]  Follow Up Time: 1-3 days

## 2024-11-09 NOTE — DISCHARGE NOTE ANTEPARTUM - NS MD DC FALL RISK RISK
For information on Fall & Injury Prevention, visit: [email or web address] OR  [email or web address] OR  https://www.cdc.gov/steadi/patient.html

## 2024-11-09 NOTE — DISCHARGE NOTE ANTEPARTUM - CARE PLAN
Principal Discharge DX:	Preeclampsia  Assessment and plan of treatment:	Take blood pressures 2-3 times daily.  Call MD with blood pressures greater than 150/90 or with signs and symptoms of pre-eclampsia.  Report to [facility] testing office 2x weekly and bloodwork weekly,   1

## 2024-11-09 NOTE — DISCHARGE NOTE ANTEPARTUM - IF YOU ARE A SMOKER, IT IS IMPORTANT FOR YOUR HEALTH TO STOP SMOKING. PLEASE BE AWARE THAT SECOND HAND SMOKE IS ALSO HARMFUL.
Author reached out to patient to confirm colonscopy 03/19. Patient states he rescheduled appt to 04/03 at 1415.    Patient scheduled in outlook calendar on 03/19 and 04/03.  Author to send e-advice with updated appointment.     
LVM for patient to call back [facility] to confirm colonoscopy appointment for 3/19 and review bowel prep instructions.    
Statement Selected

## 2024-11-09 NOTE — DISCHARGE NOTE ANTEPARTUM - PATIENT PORTAL LINK FT
You can access the FollowMyHealth Patient Portal offered by [facility] by registering at the following website: [email or web address]. By joining [employer]’s FollowMyHealth portal, you will also be able to view your health information using other applications (apps) compatible with our system.

## 2024-11-09 NOTE — DISCHARGE NOTE ANTEPARTUM - PLAN OF CARE
Take blood pressures 2-3 times daily.  Call MD with blood pressures greater than 150/90 or with signs and symptoms of pre-eclampsia.  Report to [facility] testing office 2x weekly and bloodwork weekly,

## 2024-11-09 NOTE — DISCHARGE NOTE ANTEPARTUM - FINANCIAL ASSISTANCE
[facility] provides services at a reduced cost to those who are determined to be eligible through [facility]’s financial assistance program. Information regarding [facility]’s financial assistance program can be found by going to [email or web address] or by calling [phone].

## 2024-11-09 NOTE — OB PROVIDER LABOR PROGRESS NOTE - ASSESSMENT
30 yo  @ 35 3/7 weeks presents with PEC withut SF admitted for observation for labile bps asymptomatic pc ratio 0.4; NST reactive; vitals stable    1. Continuous maternal fetal monitoring  2. Continue blood pressure monitoring   3. Continue with urine collection for 24 hour sample   4. NST reviewed  5. Dr. Barnes made aware

## 2024-11-09 NOTE — DISCHARGE NOTE ANTEPARTUM - REASON FOR REFUSAL (REFER PATIENT TO HEALTHCARE PROVIDER FOR FOLLOW-UP):
Spoke to Ara Sanchez after her visit with Dr. Medellin. Clarified that GC is still recommended per Luly Becerra GC based on her dx & her father's prostate cancer. Reassured her that there is no rush to do this. She would like Laura to be contacted to arrange this.    Also, the plan is to see Dr. Medellin 3 months after she completes RT with Dr. Berger. Laura will call back to arrange that appt.  
will fallow up with PCP

## 2024-11-11 DIAGNOSIS — Z3A.35 35 WEEKS GESTATION OF PREGNANCY: ICD-10-CM

## 2024-11-11 DIAGNOSIS — R03.0 ELEVATED BLOOD-PRESSURE READING, WITHOUT DIAGNOSIS OF HYPERTENSION: ICD-10-CM

## 2024-11-11 DIAGNOSIS — E66.812 OBESITY, CLASS 2: ICD-10-CM

## 2024-11-11 DIAGNOSIS — Z34.90 ENCOUNTER FOR SUPERVISION OF NORMAL PREGNANCY, UNSPECIFIED, UNSPECIFIED TRIMESTER: ICD-10-CM

## 2024-11-15 ENCOUNTER — ASOB RESULT (OUTPATIENT)
Age: 29
End: 2024-11-15

## 2024-11-15 ENCOUNTER — APPOINTMENT (OUTPATIENT)
Dept: OBGYN | Facility: CLINIC | Age: 29
End: 2024-11-15
Payer: MEDICAID

## 2024-11-15 ENCOUNTER — LABORATORY RESULT (OUTPATIENT)
Age: 29
End: 2024-11-15

## 2024-11-15 ENCOUNTER — APPOINTMENT (OUTPATIENT)
Dept: ANTEPARTUM | Facility: CLINIC | Age: 29
End: 2024-11-15
Payer: MEDICAID

## 2024-11-15 ENCOUNTER — OUTPATIENT (OUTPATIENT)
Dept: OUTPATIENT SERVICES | Facility: HOSPITAL | Age: 29
LOS: 1 days | End: 2024-11-15
Payer: MEDICAID

## 2024-11-15 VITALS
TEMPERATURE: 99 F | HEIGHT: 65 IN | SYSTOLIC BLOOD PRESSURE: 131 MMHG | RESPIRATION RATE: 18 BRPM | WEIGHT: 234 LBS | OXYGEN SATURATION: 99 % | BODY MASS INDEX: 38.99 KG/M2 | DIASTOLIC BLOOD PRESSURE: 85 MMHG | HEART RATE: 86 BPM

## 2024-11-15 DIAGNOSIS — Z34.00 ENCOUNTER FOR SUPERVISION OF NORMAL FIRST PREGNANCY, UNSPECIFIED TRIMESTER: ICD-10-CM

## 2024-11-15 DIAGNOSIS — Z34.90 ENCOUNTER FOR SUPERVISION OF NORMAL PREGNANCY, UNSPECIFIED, UNSPECIFIED TRIMESTER: ICD-10-CM

## 2024-11-15 DIAGNOSIS — O14.93 UNSPECIFIED PRE-ECLAMPSIA, THIRD TRIMESTER: ICD-10-CM

## 2024-11-15 DIAGNOSIS — Z98.890 OTHER SPECIFIED POSTPROCEDURAL STATES: Chronic | ICD-10-CM

## 2024-11-15 PROCEDURE — 76818 FETAL BIOPHYS PROFILE W/NST: CPT | Mod: 59

## 2024-11-15 PROCEDURE — 87389 HIV-1 AG W/HIV-1&-2 AB AG IA: CPT

## 2024-11-15 PROCEDURE — 87653 STREP B DNA AMP PROBE: CPT

## 2024-11-15 PROCEDURE — G0463: CPT

## 2024-11-15 PROCEDURE — 86592 SYPHILIS TEST NON-TREP QUAL: CPT

## 2024-11-15 PROCEDURE — 81003 URINALYSIS AUTO W/O SCOPE: CPT

## 2024-11-15 PROCEDURE — 99204 OFFICE O/P NEW MOD 45 MIN: CPT | Mod: 25

## 2024-11-15 PROCEDURE — 80053 COMPREHEN METABOLIC PANEL: CPT

## 2024-11-15 PROCEDURE — 84550 ASSAY OF BLOOD/URIC ACID: CPT

## 2024-11-15 PROCEDURE — 86780 TREPONEMA PALLIDUM: CPT

## 2024-11-15 PROCEDURE — 86593 SYPHILIS TEST NON-TREP QUANT: CPT

## 2024-11-15 PROCEDURE — 84156 ASSAY OF PROTEIN URINE: CPT

## 2024-11-15 PROCEDURE — 82570 ASSAY OF URINE CREATININE: CPT

## 2024-11-15 PROCEDURE — 85025 COMPLETE CBC W/AUTO DIFF WBC: CPT

## 2024-11-15 PROCEDURE — 76816 OB US FOLLOW-UP PER FETUS: CPT

## 2024-11-15 PROCEDURE — 87591 N.GONORRHOEAE DNA AMP PROB: CPT

## 2024-11-15 PROCEDURE — 87491 CHLMYD TRACH DNA AMP PROBE: CPT

## 2024-11-15 PROCEDURE — 99213 OFFICE O/P EST LOW 20 MIN: CPT

## 2024-11-18 ENCOUNTER — NON-APPOINTMENT (OUTPATIENT)
Age: 29
End: 2024-11-18

## 2024-11-18 DIAGNOSIS — O14.93 UNSPECIFIED PRE-ECLAMPSIA, THIRD TRIMESTER: ICD-10-CM

## 2024-11-18 DIAGNOSIS — B95.1 STREPTOCOCCUS, GROUP B, AS THE CAUSE OF DISEASES CLASSIFIED ELSEWHERE: ICD-10-CM

## 2024-11-18 LAB
ALBUMIN SERPL ELPH-MCNC: 3.7 G/DL
ALP BLD-CCNC: 123 U/L
ALT SERPL-CCNC: 15 U/L
ANION GAP SERPL CALC-SCNC: 12 MMOL/L
AST SERPL-CCNC: 14 U/L
BASOPHILS # BLD AUTO: 0.01 K/UL
BASOPHILS NFR BLD AUTO: 0.2 %
BILIRUB SERPL-MCNC: 0.4 MG/DL
BUN SERPL-MCNC: 3 MG/DL
C TRACH RRNA SPEC QL NAA+PROBE: NOT DETECTED
CALCIUM SERPL-MCNC: 9.8 MG/DL
CHLORIDE SERPL-SCNC: 107 MMOL/L
CO2 SERPL-SCNC: 21 MMOL/L
CREAT SERPL-MCNC: 0.74 MG/DL
CREAT SPEC-SCNC: 134 MG/DL
CREAT/PROT UR: 0.1 RATIO
EGFR: 112 ML/MIN/1.73M2
EOSINOPHIL # BLD AUTO: 0.04 K/UL
EOSINOPHIL NFR BLD AUTO: 0.6 %
GLUCOSE SERPL-MCNC: 52 MG/DL
GP B STREP DNA SPEC QL NAA+PROBE: DETECTED
HCT VFR BLD CALC: 41 %
HGB BLD-MCNC: 13.7 G/DL
HIV1+2 AB SPEC QL IA.RAPID: NONREACTIVE
IMM GRANULOCYTES NFR BLD AUTO: 0.6 %
LYMPHOCYTES # BLD AUTO: 1.47 K/UL
LYMPHOCYTES NFR BLD AUTO: 23.6 %
MAN DIFF?: NORMAL
MCHC RBC-ENTMCNC: 31.3 PG
MCHC RBC-ENTMCNC: 33.4 G/DL
MCV RBC AUTO: 93.6 FL
MONOCYTES # BLD AUTO: 0.65 K/UL
MONOCYTES NFR BLD AUTO: 10.4 %
N GONORRHOEA RRNA SPEC QL NAA+PROBE: NOT DETECTED
NEUTROPHILS # BLD AUTO: 4.03 K/UL
NEUTROPHILS NFR BLD AUTO: 64.6 %
PLATELET # BLD AUTO: 239 K/UL
POTASSIUM SERPL-SCNC: 4.5 MMOL/L
PROT SERPL-MCNC: 6.8 G/DL
PROT UR-MCNC: 18 MG/DL
RBC # BLD: 4.38 M/UL
RBC # FLD: 13.2 %
SODIUM SERPL-SCNC: 141 MMOL/L
SOURCE AMPLIFICATION: NORMAL
SOURCE GBS: NORMAL
T PALLIDUM AB SER QL IA: POSITIVE
URATE SERPL-MCNC: 3.3 MG/DL
WBC # FLD AUTO: 6.24 K/UL

## 2024-11-19 ENCOUNTER — APPOINTMENT (OUTPATIENT)
Dept: ANTEPARTUM | Facility: CLINIC | Age: 29
End: 2024-11-19

## 2024-11-19 ENCOUNTER — INPATIENT (INPATIENT)
Facility: HOSPITAL | Age: 29
LOS: 2 days | Discharge: HOME | DRG: 951 | End: 2024-11-22
Attending: OBSTETRICS & GYNECOLOGY | Admitting: OBSTETRICS & GYNECOLOGY
Payer: MEDICAID

## 2024-11-19 VITALS
WEIGHT: 233.03 LBS | OXYGEN SATURATION: 98 % | HEIGHT: 64 IN | HEART RATE: 93 BPM | DIASTOLIC BLOOD PRESSURE: 99 MMHG | RESPIRATION RATE: 17 BRPM | SYSTOLIC BLOOD PRESSURE: 183 MMHG | TEMPERATURE: 98 F

## 2024-11-19 DIAGNOSIS — O26.899 OTHER SPECIFIED PREGNANCY RELATED CONDITIONS, UNSPECIFIED TRIMESTER: ICD-10-CM

## 2024-11-19 DIAGNOSIS — Z34.80 ENCOUNTER FOR SUPERVISION OF OTHER NORMAL PREGNANCY, UNSPECIFIED TRIMESTER: ICD-10-CM

## 2024-11-19 DIAGNOSIS — O14.90 UNSPECIFIED PRE-ECLAMPSIA, UNSPECIFIED TRIMESTER: ICD-10-CM

## 2024-11-19 DIAGNOSIS — Z98.890 OTHER SPECIFIED POSTPROCEDURAL STATES: Chronic | ICD-10-CM

## 2024-11-19 LAB
ALBUMIN SERPL ELPH-MCNC: 2.8 G/DL — LOW (ref 3.5–5)
ALP SERPL-CCNC: 119 U/L — SIGNIFICANT CHANGE UP (ref 40–120)
ALT FLD-CCNC: 17 U/L DA — SIGNIFICANT CHANGE UP (ref 10–60)
ANION GAP SERPL CALC-SCNC: 7 MMOL/L — SIGNIFICANT CHANGE UP (ref 5–17)
APPEARANCE UR: ABNORMAL
APTT BLD: 28.3 SEC — SIGNIFICANT CHANGE UP (ref 24.5–35.6)
AST SERPL-CCNC: 19 U/L — SIGNIFICANT CHANGE UP (ref 10–40)
BASOPHILS # BLD AUTO: 0.01 K/UL — SIGNIFICANT CHANGE UP (ref 0–0.2)
BASOPHILS NFR BLD AUTO: 0.1 % — SIGNIFICANT CHANGE UP (ref 0–2)
BILIRUB SERPL-MCNC: 0.5 MG/DL — SIGNIFICANT CHANGE UP (ref 0.2–1.2)
BILIRUB UR-MCNC: NEGATIVE — SIGNIFICANT CHANGE UP
BUN SERPL-MCNC: 8 MG/DL — SIGNIFICANT CHANGE UP (ref 7–18)
CALCIUM SERPL-MCNC: 9.2 MG/DL — SIGNIFICANT CHANGE UP (ref 8.4–10.5)
CHLORIDE SERPL-SCNC: 110 MMOL/L — HIGH (ref 96–108)
CO2 SERPL-SCNC: 24 MMOL/L — SIGNIFICANT CHANGE UP (ref 22–31)
COLOR SPEC: YELLOW — SIGNIFICANT CHANGE UP
CREAT ?TM UR-MCNC: 115 MG/DL — SIGNIFICANT CHANGE UP
CREAT SERPL-MCNC: 0.81 MG/DL — SIGNIFICANT CHANGE UP (ref 0.5–1.3)
DIFF PNL FLD: NEGATIVE — SIGNIFICANT CHANGE UP
EGFR: 101 ML/MIN/1.73M2 — SIGNIFICANT CHANGE UP
EOSINOPHIL # BLD AUTO: 0.02 K/UL — SIGNIFICANT CHANGE UP (ref 0–0.5)
EOSINOPHIL NFR BLD AUTO: 0.3 % — SIGNIFICANT CHANGE UP (ref 0–6)
FIBRINOGEN PPP-MCNC: 432 MG/DL — SIGNIFICANT CHANGE UP (ref 200–475)
GLUCOSE SERPL-MCNC: 82 MG/DL — SIGNIFICANT CHANGE UP (ref 70–99)
GLUCOSE UR QL: NEGATIVE MG/DL — SIGNIFICANT CHANGE UP
HCT VFR BLD CALC: 38.3 % — SIGNIFICANT CHANGE UP (ref 34.5–45)
HGB BLD-MCNC: 13.2 G/DL — SIGNIFICANT CHANGE UP (ref 11.5–15.5)
IMM GRANULOCYTES NFR BLD AUTO: 0.3 % — SIGNIFICANT CHANGE UP (ref 0–0.9)
INR BLD: 0.92 RATIO — SIGNIFICANT CHANGE UP (ref 0.85–1.16)
KETONES UR-MCNC: NEGATIVE MG/DL — SIGNIFICANT CHANGE UP
LDH SERPL L TO P-CCNC: 167 U/L — SIGNIFICANT CHANGE UP (ref 120–225)
LEUKOCYTE ESTERASE UR-ACNC: ABNORMAL
LYMPHOCYTES # BLD AUTO: 1.47 K/UL — SIGNIFICANT CHANGE UP (ref 1–3.3)
LYMPHOCYTES # BLD AUTO: 20.5 % — SIGNIFICANT CHANGE UP (ref 13–44)
MCHC RBC-ENTMCNC: 31.4 PG — SIGNIFICANT CHANGE UP (ref 27–34)
MCHC RBC-ENTMCNC: 34.5 G/DL — SIGNIFICANT CHANGE UP (ref 32–36)
MCV RBC AUTO: 91.2 FL — SIGNIFICANT CHANGE UP (ref 80–100)
MONOCYTES # BLD AUTO: 0.7 K/UL — SIGNIFICANT CHANGE UP (ref 0–0.9)
MONOCYTES NFR BLD AUTO: 9.8 % — SIGNIFICANT CHANGE UP (ref 2–14)
NEUTROPHILS # BLD AUTO: 4.95 K/UL — SIGNIFICANT CHANGE UP (ref 1.8–7.4)
NEUTROPHILS NFR BLD AUTO: 69 % — SIGNIFICANT CHANGE UP (ref 43–77)
NITRITE UR-MCNC: NEGATIVE — SIGNIFICANT CHANGE UP
NRBC # BLD: 0 /100 WBCS — SIGNIFICANT CHANGE UP (ref 0–0)
PH UR: 6.5 — SIGNIFICANT CHANGE UP (ref 5–8)
PLATELET # BLD AUTO: 233 K/UL — SIGNIFICANT CHANGE UP (ref 150–400)
POTASSIUM SERPL-MCNC: 3.8 MMOL/L — SIGNIFICANT CHANGE UP (ref 3.5–5.3)
POTASSIUM SERPL-SCNC: 3.8 MMOL/L — SIGNIFICANT CHANGE UP (ref 3.5–5.3)
PROT ?TM UR-MCNC: 25 MG/DL — HIGH (ref 0–12)
PROT SERPL-MCNC: 6.9 G/DL — SIGNIFICANT CHANGE UP (ref 6–8.3)
PROT UR-MCNC: ABNORMAL MG/DL
PROT/CREAT UR-RTO: 0.2 RATIO — SIGNIFICANT CHANGE UP (ref 0–0.2)
PROTHROM AB SERPL-ACNC: 10.7 SEC — SIGNIFICANT CHANGE UP (ref 9.9–13.4)
RBC # BLD: 4.2 M/UL — SIGNIFICANT CHANGE UP (ref 3.8–5.2)
RBC # FLD: 12.8 % — SIGNIFICANT CHANGE UP (ref 10.3–14.5)
SODIUM SERPL-SCNC: 141 MMOL/L — SIGNIFICANT CHANGE UP (ref 135–145)
SP GR SPEC: 1.01 — SIGNIFICANT CHANGE UP (ref 1–1.03)
URATE SERPL-MCNC: 3.4 MG/DL — SIGNIFICANT CHANGE UP (ref 2.5–7)
UROBILINOGEN FLD QL: 0.2 MG/DL — SIGNIFICANT CHANGE UP (ref 0.2–1)
WBC # BLD: 7.17 K/UL — SIGNIFICANT CHANGE UP (ref 3.8–10.5)
WBC # FLD AUTO: 7.17 K/UL — SIGNIFICANT CHANGE UP (ref 3.8–10.5)

## 2024-11-19 RX ORDER — 0.9 % SODIUM CHLORIDE 0.9 %
1000 INTRAVENOUS SOLUTION INTRAVENOUS
Refills: 0 | Status: DISCONTINUED | OUTPATIENT
Start: 2024-11-19 | End: 2024-11-20

## 2024-11-19 RX ORDER — 0.9 % SODIUM CHLORIDE 0.9 %
1000 INTRAVENOUS SOLUTION INTRAVENOUS
Refills: 0 | Status: DISCONTINUED | OUTPATIENT
Start: 2024-11-19 | End: 2024-11-19

## 2024-11-19 RX ORDER — LABETALOL 100 MG/1
20 TABLET, FILM COATED ORAL ONCE
Refills: 0 | Status: COMPLETED | OUTPATIENT
Start: 2024-11-19 | End: 2024-11-19

## 2024-11-19 RX ORDER — CHLORHEXIDINE GLUCONATE 1.2 MG/ML
1 RINSE ORAL DAILY
Refills: 0 | Status: DISCONTINUED | OUTPATIENT
Start: 2024-11-19 | End: 2024-11-21

## 2024-11-19 RX ORDER — INFLUENZA VIRUS VACCINE 15; 15; 15; 15 UG/.5ML; UG/.5ML; UG/.5ML; UG/.5ML
0.5 SUSPENSION INTRAMUSCULAR ONCE
Refills: 0 | Status: DISCONTINUED | OUTPATIENT
Start: 2024-11-19 | End: 2024-11-22

## 2024-11-19 RX ORDER — AMPICILLIN TRIHYDRATE 250 MG/5ML
2 SUSPENSION, RECONSTITUTED, ORAL (ML) ORAL ONCE
Refills: 0 | Status: COMPLETED | OUTPATIENT
Start: 2024-11-19 | End: 2024-11-19

## 2024-11-19 RX ORDER — AMPICILLIN TRIHYDRATE 250 MG/5ML
1 SUSPENSION, RECONSTITUTED, ORAL (ML) ORAL EVERY 4 HOURS
Refills: 0 | Status: DISCONTINUED | OUTPATIENT
Start: 2024-11-19 | End: 2024-11-20

## 2024-11-19 RX ADMIN — Medication 300 GRAM(S): at 16:40

## 2024-11-19 RX ADMIN — Medication 108 GRAM(S): at 22:45

## 2024-11-19 RX ADMIN — Medication 200 GRAM(S): at 16:53

## 2024-11-19 RX ADMIN — Medication 50 GM/HR: at 19:25

## 2024-11-19 RX ADMIN — Medication 75 MILLILITER(S): at 16:46

## 2024-11-19 RX ADMIN — LABETALOL 20 MILLIGRAM(S): 100 TABLET, FILM COATED ORAL at 16:25

## 2024-11-19 NOTE — OB PROVIDER H&P - NS_EFW_OBGYN_ALL_OB_FT
[address]10 Calcipotriene Pregnancy And Lactation Text: The use of this medication during pregnancy or lactation is not recommended as there is insufficient data.

## 2024-11-19 NOTE — OB RN PATIENT PROFILE - AS SC BRADEN NUTRITION
at home:
Xolair Pregnancy And Lactation Text: This medication is Pregnancy Category B and is considered safe during pregnancy. This medication is excreted in breast milk.
(4) excellent

## 2024-11-19 NOTE — OB RN PATIENT PROFILE - FALL HARM RISK - UNIVERSAL INTERVENTIONS
Bed in lowest position, wheels locked, appropriate side rails in place/Call bell, personal items and telephone in reach/Instruct patient to call for assistance before getting out of bed or chair/Non-slip footwear when patient is out of bed/[place] to call system/Physically safe environment - no spills, clutter or unnecessary equipment/Purposeful Proactive Rounding/Room/bathroom lighting operational, light cord in reach

## 2024-11-19 NOTE — OB PROVIDER H&P - PROBLEM SELECTOR PLAN 1
Admit and consent  IV fluids and initial bloodwork  Continuous monitoring fht, toco, vitals  Magnesium bolus and labetalol push  cytotec oral for induction   pain management as needed

## 2024-11-19 NOTE — OB PROVIDER IHI INDUCTION/AUGMENTATION NOTE - NS_EFFACEMANT_OBGYN_ALL_OB_NU
Gustabo Vargas  Clinical Support     Telephone Encounter     Signed     Creation Time: 7/25/2024  2:23 PM     Signed       Procedure: Open Access Colonoscopy     Diagnosis: Special screening for malignant neoplasms, colon Z12.11     Have you had a Colonoscopy procedure the in past (If yes, send to recall): No     Does the patient have any of the following (if yes, consult with Nurse review team, if no, continue scheduling)  - COPD: No  - Have you ever been told by a provider that you have high blood pressure in the lungs, or pulmonary hypertension: No  - Have you seen or do you currently see a Cardiologist: No  - Obstructive Sleep Apnea (ERMIAS): No     Does the patient have any of the following (if yes hospital, if no ASC)   - BMI greater than 50: No  - Implantable cardiac defibrillator: No  - Heart attack or stroke in the past 6 months: No  - Use home oxygen therapy (MAC): No  - Active Chemotherapy: No  - Sickle cell disease: No  - Latex allergy that causes difficulty breathing and/or swelling in the throat when exposed: No  - Hx of a difficult intubation and/or anesthesia complications (MAC): No  - Are you on dialysis: No  - Active tuberculosis: No  - Malignant Hyperthermia or family history: No  - Assistance with one or more persons to transfer from sitting to standing: No     Current use or HX of cocaine, meth or heroin use within the past 12 months (MAC): No If yes, cocaine, meth or heroin? N/A (If yes, patient will need to have negative drug screening within one week before Colonoscopy in order to proceed)     BMI greater than 35 (If yes, pt needs MAC sedation): YES     Are you on blood thinners (If yes, schedule the pt at least 4 weeks out): No     Are you on a diabetic and/or weight loss medication (If yes, schedule the pt at least 3 weeks out): No     Are you currently breastfeeding (For female patients less than 52 years old): No     Procedure Location: [facility]; Did patient request this specific location?  Yes     Sedation: MAC Only     Pharmacy preference confirmed: Yes     Prep required? Yes, Pharmacy is [address] ; was request sent to nurse to send prep to pharmacy? Yes;      Providers preferred Prep: Suprep     Mailing address confirmed: Yes     Insurance name confirmed as Darya, will be the same at time of procedure?: Yes     Does the patient have an adult  that can bring them to and pick them up from the procedure: Yes     Patient scheduled with Dr. Efrem Rosales on 8/12/24 at Location: 84S     Procedure time: TBD ; Did patient request this specific time? n/a     Patients preferred language for prep letter: English        Sent to nurse for chart review, verify medications and prep, order prep and generate prep letter.                0

## 2024-11-19 NOTE — OB RN PATIENT PROFILE - FUNCTIONAL ASSESSMENT - BASIC MOBILITY 3.
PRINCIPAL DISCHARGE DIAGNOSIS  Diagnosis: Cervical spondylosis  Assessment and Plan of Treatment:
4 = No assist / stand by assistance

## 2024-11-19 NOTE — OB PROVIDER H&P - HISTORY OF PRESENT ILLNESS
Patient is a 29 year old  at 37wks who presents to triage with referral for mIOL for pre-eclampsia.  Denies HA, CP, SOB, blurred or changed vision, RUQ pain, abdominal pain, leakage of fluid, decreased fetal movement, or any other concerns.   PNC with [facility]  PMhx: Denies  SxHx: D&C x2, hemorrhoid surgery  Meds: PNV  Allergies: NKDA  OBHx:   Preg 1- eTOP with D&C age 15  Preg 2- eTOP with D&C age 16  GynHx: Normal menses, one partner, no cysts, on fibroids, hx of RPR positive in - treated with pcn x 3, no other stds, normal paps  SocialHx; neg x 3, + history of anxiety, was in therapy, stopped 3 years ago.  Denies depression

## 2024-11-19 NOTE — OB PROVIDER H&P - ASSESSMENT
Patient presents for mIOL for pre-eclampsia with severe features.  Severe range blood pressures noted on admission.  Plan to start magnesium and give labetalol push.

## 2024-11-19 NOTE — OB RN PATIENT PROFILE - LIMIT VISITORS, INFANT PROFILE
Geno Wayne MD        PATIENT NAME: Kelsea Sutton  : 1945  DATE: 23  MRN: [id number]      Billing Provider: Geno Wayne MD  Level of Service:   Patient PCP Information       Provider PCP Type    Primary Doctor No General            Reason for Visit / Chief Complaint: Back Pain (4 month f/u, no P.T., pt states she has been doing fairly well, RX for relief, pain level 2/10)       Update PCP  Update Chief Complaint         History of Present Illness / Problem Focused Workflow     Kelsea Sutton presents to the clinic with Back Pain (4 month f/u, no P.T., pt states she has been doing fairly well, RX for relief, pain level 2/10)     This is a 77-year-old female who returns to clinic today for follow-up of her chronic low back pain.  She underwent bilateral L5 transforaminal epidural steroid injections on August 3, 2022.  She is still getting pretty good relief of her pain since then.  About a month ago, she was at a baseball game when she went to stand up, she had pain down her left leg and had trouble bearing weight on it.  Over time, the pain has gradually subsided, but she still does have a little bit of pain in her lower back into the left leg.  She has been taking tramadol and diclofenac to help.      Follow-up    Back Pain  Associated symptoms include leg pain.       Review of Systems     Review of Systems   Musculoskeletal:  Positive for back pain, gait problem and leg pain.   All other systems reviewed and are negative.       Medical / Social / Family History     Past Medical History:   Diagnosis Date    HLD (hyperlipidemia)     Hypothyroidism, unspecified     Visual disorder        Past Surgical History:   Procedure Laterality Date    APPENDECTOMY      CARPAL TUNNEL RELEASE      ESOPHAGOGASTRODUODENOSCOPY      THYROIDECTOMY      TRANSFORAMINAL EPIDURAL INJECTION OF STEROID Bilateral 8/3/2022    Procedure: Injection,steroid,epidural,transforaminal approach;  Surgeon: Geno Wayne,  MD;  Location: [facility];  Service: Pain Management;  Laterality: Bilateral;  Bilateral L5 TFESI    TUBAL LIGATION         Social History  Ms. Sutton  reports that she has never smoked. She has never used smokeless tobacco. She reports that she does not currently use alcohol. She reports that she does not use drugs.    Family History  Ms.'s Sutton Family history is unknown by patient.    Medications and Allergies     Medications  Outpatient Medications Marked as Taking for the 9/5/23 encounter (Office Visit) with Geno Wayne MD   Medication Sig Dispense Refill    ascorbic acid, vitamin C, (VITAMIN C) 100 MG tablet Take 100 mg by mouth once daily.      calcium carbonate (OS-KANNAN) 600 mg calcium (1,500 mg) Tab Take 600 mg by mouth once.      diclofenac (VOLTAREN) 50 MG EC tablet TAKE 1 TABLET BY MOUTH TWICE A DAY AS NEEDED FOR PAIN 60 tablet 3    ezetimibe (ZETIA) 10 mg tablet       levothyroxine (SYNTHROID) 150 MCG tablet       rosuvastatin (CRESTOR) 40 MG Tab rosuvastatin 40 mg tablet      tiZANidine (ZANAFLEX) 4 MG tablet TAKE 1 TABLET BY MOUTH 3 TIMES DAILY AS NEEDED FOR MUSCLE PAIN 50 tablet 0     Current Facility-Administered Medications for the 9/5/23 encounter (Office Visit) with Geno Wayne MD   Medication Dose Route Frequency Provider Last Rate Last Admin    [COMPLETED] betamethasone acetate-betamethasone sodium phosphate injection 9 mg  9 mg Intramuscular 1 time in Clinic/HOD Geno Wayne MD   9 mg at 09/05/23 0930       Allergies  Review of patient's allergies indicates:   Allergen Reactions    Sulfa (sulfonamide antibiotics) Itching, Rash, Shortness Of Breath and Swelling       Physical Examination     Vitals:    09/05/23 0849   BP: (!) 146/80   Pulse: 89   Temp: 98.2 °F (36.8 °C)     Spine Musculoskeletal Exam    Inspection    Leg length disparity: no discrepancy    Thoracolumbar    Erythema: none    Swelling: none    Edema (right lower extremity): none    Edema (left lower extremity):  none    Ecchymosis: none    Deformity: none    General      Constitutional: appears stated age, well-developed and well-nourished    Scleral icterus: no    Labored breathing: no    Psychiatric: normal mood and affect and no acute distress    Neurological: alert and oriented x3    Skin: intact       Assessment and Plan (including Health Maintenance)      Problem List  Smart Sets  Document Outside HM   :    Plan:   Lumbar radiculitis  -     betamethasone acetate-betamethasone sodium phosphate injection 9 mg    Spinal stenosis of lumbar region with neurogenic claudication  -     betamethasone acetate-betamethasone sodium phosphate injection 9 mg    Chronic back pain greater than 3 months duration  -     betamethasone acetate-betamethasone sodium phosphate injection 9 mg    She is receiving a Celestone intramuscular injection in the office today for a flare-up of pain that has been going on for the past month or so.  It had been gradually subsiding over time.  She will continue with diclofenac and tramadol as needed.  I will see her back in 3 months.    Problem List Items Addressed This Visit       Chronic back pain greater than 3 months duration    Relevant Medications    betamethasone acetate-betamethasone sodium phosphate injection 9 mg (Completed)    Lumbar radiculitis - Primary    Relevant Medications    betamethasone acetate-betamethasone sodium phosphate injection 9 mg (Completed)    Spinal stenosis of lumbar region with neurogenic claudication    Relevant Medications    betamethasone acetate-betamethasone sodium phosphate injection 9 mg (Completed)           Future Appointments   Date Time Provider Department Center   12/5/2023  8:45 AM [facility]            There are no Patient Instructions on file for this visit.  No follow-ups on file.     Signature:  Geno Wayne MD      Date of encounter: 9/5/23             yes

## 2024-11-19 NOTE — OB PROVIDER H&P - NSLOWPPHRISK_OBGYN_A_OB
No previous uterine incision/Haley Pregnancy/Less than or equal to 4 previous vaginal births/No known bleeding disorder/No history of postpartum hemorrhage/No other PPH risks indicated

## 2024-11-20 ENCOUNTER — TRANSCRIPTION ENCOUNTER (OUTPATIENT)
Age: 29
End: 2024-11-20

## 2024-11-20 LAB
APTT BLD: 26.6 SEC — SIGNIFICANT CHANGE UP (ref 24.5–35.6)
BASOPHILS # BLD AUTO: 0.03 K/UL — SIGNIFICANT CHANGE UP (ref 0–0.2)
BASOPHILS NFR BLD AUTO: 0.2 % — SIGNIFICANT CHANGE UP (ref 0–2)
EOSINOPHIL # BLD AUTO: 0 K/UL — SIGNIFICANT CHANGE UP (ref 0–0.5)
EOSINOPHIL NFR BLD AUTO: 0 % — SIGNIFICANT CHANGE UP (ref 0–6)
FIBRINOGEN PPP-MCNC: 366 MG/DL — SIGNIFICANT CHANGE UP (ref 200–475)
HCT VFR BLD CALC: 32.2 % — LOW (ref 34.5–45)
HGB BLD-MCNC: 11.5 G/DL — SIGNIFICANT CHANGE UP (ref 11.5–15.5)
IMM GRANULOCYTES NFR BLD AUTO: 0.5 % — SIGNIFICANT CHANGE UP (ref 0–0.9)
LYMPHOCYTES # BLD AUTO: 1.06 K/UL — SIGNIFICANT CHANGE UP (ref 1–3.3)
LYMPHOCYTES # BLD AUTO: 6.2 % — LOW (ref 13–44)
MAGNESIUM SERPL-MCNC: 4.3 MG/DL — HIGH (ref 1.6–2.6)
MAGNESIUM SERPL-MCNC: 5.1 MG/DL — HIGH (ref 1.6–2.6)
MAGNESIUM SERPL-MCNC: 6 MG/DL — HIGH (ref 1.6–2.6)
MAGNESIUM SERPL-MCNC: 6.3 MG/DL — HIGH (ref 1.6–2.6)
MCHC RBC-ENTMCNC: 32.7 PG — SIGNIFICANT CHANGE UP (ref 27–34)
MCHC RBC-ENTMCNC: 35.7 G/DL — SIGNIFICANT CHANGE UP (ref 32–36)
MCV RBC AUTO: 91.5 FL — SIGNIFICANT CHANGE UP (ref 80–100)
MONOCYTES # BLD AUTO: 0.9 K/UL — SIGNIFICANT CHANGE UP (ref 0–0.9)
MONOCYTES NFR BLD AUTO: 5.3 % — SIGNIFICANT CHANGE UP (ref 2–14)
NEUTROPHILS # BLD AUTO: 14.96 K/UL — HIGH (ref 1.8–7.4)
NEUTROPHILS NFR BLD AUTO: 87.8 % — HIGH (ref 43–77)
NRBC # BLD: 0 /100 WBCS — SIGNIFICANT CHANGE UP (ref 0–0)
PLATELET # BLD AUTO: 202 K/UL — SIGNIFICANT CHANGE UP (ref 150–400)
RBC # BLD: 3.52 M/UL — LOW (ref 3.8–5.2)
RBC # FLD: 12.9 % — SIGNIFICANT CHANGE UP (ref 10.3–14.5)
T PALLIDUM AB TITR SER: POSITIVE
WBC # BLD: 17.03 K/UL — HIGH (ref 3.8–10.5)
WBC # FLD AUTO: 17.03 K/UL — HIGH (ref 3.8–10.5)

## 2024-11-20 PROCEDURE — 88305 TISSUE EXAM BY PATHOLOGIST: CPT | Mod: 26

## 2024-11-20 PROCEDURE — 59514 CESAREAN DELIVERY ONLY: CPT | Mod: U7

## 2024-11-20 DEVICE — SURGICEL FIBRILLAR 2 X 4": Type: IMPLANTABLE DEVICE | Status: FUNCTIONAL

## 2024-11-20 RX ORDER — 0.9 % SODIUM CHLORIDE 0.9 %
1000 INTRAVENOUS SOLUTION INTRAVENOUS
Refills: 0 | Status: DISCONTINUED | OUTPATIENT
Start: 2024-11-20 | End: 2024-11-20

## 2024-11-20 RX ORDER — CEFAZOLIN SODIUM 10 G
2000 VIAL (EA) INJECTION ONCE
Refills: 0 | Status: COMPLETED | OUTPATIENT
Start: 2024-11-20 | End: 2024-11-20

## 2024-11-20 RX ORDER — LANOLIN 72 %
1 OINTMENT (GRAM) TOPICAL EVERY 6 HOURS
Refills: 0 | Status: DISCONTINUED | OUTPATIENT
Start: 2024-11-20 | End: 2024-11-22

## 2024-11-20 RX ORDER — FAMOTIDINE 20 MG/1
20 TABLET, FILM COATED ORAL ONCE
Refills: 0 | Status: DISCONTINUED | OUTPATIENT
Start: 2024-11-20 | End: 2024-11-20

## 2024-11-20 RX ORDER — 0.9 % SODIUM CHLORIDE 0.9 %
1000 INTRAVENOUS SOLUTION INTRAVENOUS
Refills: 0 | Status: DISCONTINUED | OUTPATIENT
Start: 2024-11-20 | End: 2024-11-22

## 2024-11-20 RX ORDER — DIPHENHYDRAMINE HCL 25 MG
25 CAPSULE ORAL EVERY 6 HOURS
Refills: 0 | Status: DISCONTINUED | OUTPATIENT
Start: 2024-11-20 | End: 2024-11-22

## 2024-11-20 RX ORDER — OXYCODONE HYDROCHLORIDE 30 MG/1
5 TABLET ORAL ONCE
Refills: 0 | Status: DISCONTINUED | OUTPATIENT
Start: 2024-11-20 | End: 2024-11-22

## 2024-11-20 RX ORDER — IBUPROFEN 200 MG
600 TABLET ORAL EVERY 6 HOURS
Refills: 0 | Status: COMPLETED | OUTPATIENT
Start: 2024-11-20 | End: 2025-10-19

## 2024-11-20 RX ORDER — SIMETHICONE 125 MG
80 CAPSULE ORAL EVERY 4 HOURS
Refills: 0 | Status: DISCONTINUED | OUTPATIENT
Start: 2024-11-20 | End: 2024-11-22

## 2024-11-20 RX ORDER — HEPARIN SODIUM,PORCINE 1000/ML
5000 VIAL (ML) INJECTION EVERY 12 HOURS
Refills: 0 | Status: DISCONTINUED | OUTPATIENT
Start: 2024-11-20 | End: 2024-11-22

## 2024-11-20 RX ORDER — TETANUS TOXOID, REDUCED DIPHTHERIA TOXOID AND ACELLULAR PERTUSSIS VACCINE, ADSORBED 5; 2.5; 8; 8; 2.5 [IU]/.5ML; [IU]/.5ML; UG/.5ML; UG/.5ML; UG/.5ML
0.5 SUSPENSION INTRAMUSCULAR ONCE
Refills: 0 | Status: DISCONTINUED | OUTPATIENT
Start: 2024-11-20 | End: 2024-11-22

## 2024-11-20 RX ORDER — CEFAZOLIN SODIUM 10 G
2000 VIAL (EA) INJECTION ONCE
Refills: 0 | Status: DISCONTINUED | OUTPATIENT
Start: 2024-11-20 | End: 2024-11-20

## 2024-11-20 RX ORDER — OXYCODONE HYDROCHLORIDE 30 MG/1
5 TABLET ORAL
Refills: 0 | Status: COMPLETED | OUTPATIENT
Start: 2024-11-20 | End: 2024-11-27

## 2024-11-20 RX ORDER — TRISODIUM CITRATE DIHYDRATE AND CITRIC ACID MONOHYDRATE 500; 334 MG/5ML; MG/5ML
30 SOLUTION ORAL ONCE
Refills: 0 | Status: DISCONTINUED | OUTPATIENT
Start: 2024-11-20 | End: 2024-11-20

## 2024-11-20 RX ORDER — KETOROLAC TROMETHAMINE 30 MG/ML
30 INJECTION INTRAMUSCULAR; INTRAVENOUS EVERY 6 HOURS
Refills: 0 | Status: DISCONTINUED | OUTPATIENT
Start: 2024-11-20 | End: 2024-11-21

## 2024-11-20 RX ORDER — TRANEXAMIC ACID 100 MG/ML
1000 INJECTION INTRAVENOUS ONCE
Refills: 0 | Status: COMPLETED | OUTPATIENT
Start: 2024-11-20 | End: 2024-11-20

## 2024-11-20 RX ORDER — AZITHROMYCIN 250 MG/1
500 TABLET, FILM COATED ORAL ONCE
Refills: 0 | Status: COMPLETED | OUTPATIENT
Start: 2024-11-20 | End: 2024-11-20

## 2024-11-20 RX ORDER — CARBOPROST TROMETHAMINE 250 UG/ML
250 INJECTION, SOLUTION INTRAMUSCULAR ONCE
Refills: 0 | Status: COMPLETED | OUTPATIENT
Start: 2024-11-20 | End: 2024-11-20

## 2024-11-20 RX ORDER — ACETAMINOPHEN 500MG 500 MG/1
975 TABLET, COATED ORAL
Refills: 0 | Status: DISCONTINUED | OUTPATIENT
Start: 2024-11-20 | End: 2024-11-22

## 2024-11-20 RX ORDER — ONDANSETRON HYDROCHLORIDE 4 MG/1
4 TABLET, FILM COATED ORAL ONCE
Refills: 0 | Status: DISCONTINUED | OUTPATIENT
Start: 2024-11-20 | End: 2024-11-22

## 2024-11-20 RX ORDER — CHLORHEXIDINE GLUCONATE 1.2 MG/ML
1 RINSE ORAL DAILY
Refills: 0 | Status: DISCONTINUED | OUTPATIENT
Start: 2024-11-20 | End: 2024-11-20

## 2024-11-20 RX ADMIN — Medication 75 MILLILITER(S): at 12:33

## 2024-11-20 RX ADMIN — CARBOPROST TROMETHAMINE 250 MICROGRAM(S): 250 INJECTION, SOLUTION INTRAMUSCULAR at 23:10

## 2024-11-20 RX ADMIN — AZITHROMYCIN 255 MILLIGRAM(S): 250 TABLET, FILM COATED ORAL at 15:05

## 2024-11-20 RX ADMIN — Medication 108 GRAM(S): at 05:42

## 2024-11-20 RX ADMIN — KETOROLAC TROMETHAMINE 30 MILLIGRAM(S): 30 INJECTION INTRAMUSCULAR; INTRAVENOUS at 18:50

## 2024-11-20 RX ADMIN — Medication 108 GRAM(S): at 11:15

## 2024-11-20 RX ADMIN — KETOROLAC TROMETHAMINE 30 MILLIGRAM(S): 30 INJECTION INTRAMUSCULAR; INTRAVENOUS at 18:20

## 2024-11-20 RX ADMIN — TRANEXAMIC ACID 660 MILLIGRAM(S): 100 INJECTION INTRAVENOUS at 23:58

## 2024-11-20 RX ADMIN — Medication 42 MILLIUNIT(S)/MIN: at 16:57

## 2024-11-20 RX ADMIN — Medication 100 MILLIGRAM(S): at 15:28

## 2024-11-20 RX ADMIN — Medication 0.25 MILLIGRAM(S): at 11:00

## 2024-11-20 RX ADMIN — Medication 108 GRAM(S): at 02:14

## 2024-11-20 RX ADMIN — KETOROLAC TROMETHAMINE 30 MILLIGRAM(S): 30 INJECTION INTRAMUSCULAR; INTRAVENOUS at 23:39

## 2024-11-20 RX ADMIN — Medication 0.25 MILLIGRAM(S): at 13:10

## 2024-11-20 RX ADMIN — CHLORHEXIDINE GLUCONATE 1 APPLICATION(S): 1.2 RINSE ORAL at 15:11

## 2024-11-20 RX ADMIN — Medication 75 MILLILITER(S): at 20:15

## 2024-11-20 RX ADMIN — ACETAMINOPHEN 500MG 975 MILLIGRAM(S): 500 TABLET, COATED ORAL at 20:14

## 2024-11-20 NOTE — OB RN DELIVERY SUMMARY - NSSELHIDDEN_OBGYN_ALL_OB_FT
[[email or web address]_OB_FT:[email or web address]],[NS_DeliveryAssist1_OBGYN_ALL_OB_FT:[id number]==]

## 2024-11-20 NOTE — OB RN DELIVERY SUMMARY - NS_PROPHYLABX_OBGYN_ALL_OB
SICU Transfer Note    Transfer from: SICU  Transfer to: 8S   Accepting physician: Dr. Berny Foster    SICU COURSE: Pt was admitted under SICU care while in PACU due to hyponatremia, elevated lactate, and HD monitoring s/p ex lap, umbilical hernia repair, small bowel resection. Pt was started on NS @120/hr, which had begun to resolve (Na trend 121 >123>126 with continuous IV administration). Pt has been kept NPO with NGT in place. Pt has been hemodynamically stable while under SICU care and was thus deemed appropriate for transfer to floor.       ASSESSMENT & PLAN:   64F p/w lower abdominal pain. Patient feels like hernia is hard to touch and painful. Has not have a bowel movement in ~2 weeks. Has been having 3 weeks of abdominal pain but has worsened recently.  She has not passed gas from below in over 7 days. CT showing closed loop bowel obstruction in the hernia with perforation. Pt is now s/p ex lap, SBR, umbilical hernia repair w/ lipectomy. Feculent peritonitis w/ purulence encountered intra-operatively. SICU consulted for post-operative hyponatremia and HD monitoring i/s/o case complexity and purulence.       For Follow-Up:  - Zosyn from 9/25-9/29 (4d course)   - trending Na   - continuing NS resuscitation   - Pain control PRN  - NPO with NGT   - Matute maintaining   - DVT ppx: lovenox, SCDs  - f/u AM labs     Vital Signs Last 24 Hrs  T(C): 36.6 (25 Sep 2024 13:35), Max: 36.6 (25 Sep 2024 03:00)  T(F): 97.8 (25 Sep 2024 13:35), Max: 97.8 (25 Sep 2024 03:00)  HR: 86 (25 Sep 2024 13:35) (67 - 91)  BP: 109/59 (25 Sep 2024 13:35) (90/54 - 123/68)  BP(mean): 68 (25 Sep 2024 13:00) (65 - 82)  RR: 18 (25 Sep 2024 13:35) (14 - 21)  SpO2: 98% (25 Sep 2024 13:35) (92% - 100%)    Parameters below as of 25 Sep 2024 13:35  Patient On (Oxygen Delivery Method): room air      I&O's Summary    24 Sep 2024 07:01  -  25 Sep 2024 07:00  --------------------------------------------------------  IN: 1870 mL / OUT: 774 mL / NET: 1096 mL    25 Sep 2024 07:01  -  25 Sep 2024 15:55  --------------------------------------------------------  IN: 1750 mL / OUT: 592 mL / NET: 1158 mL          MEDICATIONS  (STANDING):  acetaminophen   IVPB .. 1000 milliGRAM(s) IV Intermittent every 6 hours  chlorhexidine 2% Cloths 1 Application(s) Topical daily  enoxaparin Injectable 40 milliGRAM(s) SubCutaneous every 24 hours  influenza   Vaccine 0.5 milliLiter(s) IntraMuscular once  piperacillin/tazobactam IVPB.. 3.375 Gram(s) IV Intermittent every 8 hours  sodium chloride 0.9%. 1000 milliLiter(s) (125 mL/Hr) IV Continuous <Continuous>    MEDICATIONS  (PRN):  HYDROmorphone  Injectable 0.2 milliGRAM(s) IV Push every 4 hours PRN Moderate Pain (4 - 6)  HYDROmorphone  Injectable 0.5 milliGRAM(s) IV Push every 4 hours PRN Severe Pain (7 - 10)        LABS                                            12.1                  Neurophils% (auto):   x      (09-25 @ 04:25):    17.01)-----------(171          Lymphocytes% (auto):  x                                             35.0                   Eosinphils% (auto):   x        Manual%: Neutrophils x    ; Lymphocytes x    ; Eosinophils x    ; Bands%: x    ; Blasts x                                    126    |  90     |  59                  Calcium: 7.2   / iCa: x      (09-25 @ 09:28)    ----------------------------<  111       Magnesium: 2.10                             3.9     |  20     |  1.30             Phosphorous: 5.1      TPro  7.2    /  Alb  3.3    /  TBili  0.8    /  DBili  x      /  AST  18     /  ALT  14     /  AlkPhos  111    24 Sep 2024 18:46    ( 09-24 @ 22:11 )   PT: 16.7 sec;   INR: 1.44 ratio  aPTT: 26.9 sec Yes

## 2024-11-20 NOTE — OB RN INTRAOPERATIVE NOTE - NSSELHIDDEN_OBGYN_ALL_OB_FT
[[email or web address]_ALL_OB_FT:[email or web address]],[NS_DeliveryAssist1_OBGYN_ALL_OB_FT:[id number]]

## 2024-11-20 NOTE — OB RN DELIVERY SUMMARY - NS_SEPSISRSKCALC_OBGYN_ALL_OB_FT
EOS calculated successfully. EOS Risk Factor: 0.5/1000 live births ([facility] national incidence); GA=37w1d; Temp=98.1; ROM=4.883; GBS='Positive'; Antibiotics='GBS specific antibiotics > 2 hrs prior to birth'

## 2024-11-20 NOTE — OB RN INTRAOPERATIVE NOTE - NS_ANESTHESIAEND_OBGYN_ALL_OB_DT
20-Nov-2024 16:35 Modified Advancement Flap Text: The defect edges were debeveled with a #15 scalpel blade.  Given the location of the defect, shape of the defect and the proximity to free margins a modified advancement flap was deemed most appropriate.  Using a sterile surgical marker, an appropriate advancement flap was drawn incorporating the defect and placing the expected incisions within the relaxed skin tension lines where possible.    The area thus outlined was incised deep to adipose tissue with a #15 scalpel blade.  The skin margins were undermined to an appropriate distance in all directions utilizing iris scissors.

## 2024-11-20 NOTE — OB PROVIDER LABOR PROGRESS NOTE - NS_OBIHICONTRACTIONPATTERNDETAILS_OBGYN_ALL_OB_FT
S:    50 yo F here for follow up on several issues including:     Neurology  Fibromyalgia, somatic dysfunction w/ chronic generalized pain. See prior visit notes w/ me from 4/15/17 and w/ Dr. Pina of Neurology. Extensive prior testing w/ Dr. Pina showed low zinc and B6 level, Rx'd zinc and B6 supplementation in past, caused stomach upset, took for about 2 months then stopped taking. Was able to to decrease neurontin to 200mg po TID after last visit--doing better, no longer feeling \"fuzzy\" or tired. Working on initiating low-dose naltrexone as follows:  - plan was to begin low dose naltrexone therapy after taper and d/c of oxycodone at time of last visit        - at today's visit, if taper/d/c off oxycodone successful, start naltrexone at 0.5mg po QD w/ an increase in dose of 0.5mg weekly to initial dosing goal of 2.5 mg po QD.     States she forgot exactly how to follow taper and actually stayed on the 10mg QID dose x at least one more week before beginning taper. Has three 10mg tabs left. Generally feeling good, but developed diarrhea w/ taper so \"I kept taking them to stop the diarrhea.\" Also still taking OTC colace, believes she has already stopped the Mg supplement Rx'd in the past.      Depression  Switched to Cymbalta from lexapro last visit 4/25/17 2/2 continued depressive sx's and pain. Now on 60mg po QD, doing better. Tolerating well.      Social  Still having issues w/ landlord--mult letters written re: personal caregiver, etc after last visit (see prior phone messages). May be looking for new place to live.     Derm  Rash abdominal fold, bilateral groin/inner thighs. Worsening x 1-2 weeks, now w/ some skin breakdown R abdominal fold.     Misc  Added clonidine 0.1 mg patch last visit for HTN/hot flashes. Trouble getting it to stay on so using irregularly, but \"when it stayed on, felt better\" as far as hot flashed were concerned.   Still smoking, 1/2 PPD or less.     O:  Visit Vitals   • /80 (BP 
[place] [facility] [place]itting, Cuff Size: Large Adult)   • Pulse 73   • Temp 96.1 °F (35.6 °C) (Tympanic)   • LMP 04/11/2013   • SpO2 95%     General: Patient is alert and oriented x 3, no acute distress.  Well-groomed, appropriate mood and affect, good eye contact.     Heart: regular rate and rhythm, no murmur, S3 or S4.   Lungs: clear to auscultation bilaterally without crackles or wheezes.  Extremities: no edema.  Derm: Confluent, mildly erythematous macular rash w/ geographic borders noted bilateral abdominal skin folds, bilaeral groin area and bilateral medial upper thighs. Affected areas are moist w/maceration and small area of skin breakdown noted R abdominal skin fold.     A/P:    52 yo F here to f/u on:     Somatic Dysfunction/Fibromyalgia  - reviewed appropriate oxycodone tapering schedule  - at present, take 5 mg po BID--2 week supply given in addition to 3 remaining tabs  - pursuing Rx of low-dose naltrexone through compounding pharmacy in [place] (see 4/15/17 note for details)--cost may be an issue. Asked [facility] pharmacy team to help work w/ pharmacy/insurance in this regard. Will begin PA process if this is an option. Plan will be to start 0.5mg po QD x 1 week, then increase dose by 0.5 mg each week to initial goal dosage of 2.5 mg po QD.   - now on gabapentin 200mg po TID--tolerating better  - Tolerating Cymbalta added in 4/2017 well  - restart Zinc. If tolerating, then restart B6. Both given for neuropathy.     Depression  - doing better since switch to Cymbalta  - asked referral specialist to f/u w/ pt on referral to therapist placed 4/2017--needs to pursue CBT    GI  - diarrhea related to decrease in opioid dose  - stop OTC colace and Mg (if still taking)  - work on healthier eating--plant fiber in particular  - will monitor as diet adjustments made, naltrexone added    HTN  - stable today  - cont clonidine patch--use on L leg (successfully stayed on in this location--OK to rotate to different 
spots on this leg w/ each reapplication to avoid skin irritation)    PreDiabetes  - on metformin 1000mg po qd for potential prevention of progression to DM--would not change dosing at present unless diarrhea persists over time  - dietary/exercise modification, weight loss discussed briefly again today    Misc  - received info from insurer re: need for statin 2/2 elevated CV risk. However, when calculating CV risk, they were using DM as a diagnosis. Calculation as a non-diabetic, even while an active smoker, is 4.4% 10 year CV risk. Will cont to monitor and add statin in future as indicated if risk factors/risk calculation change.     Education: Discussed above plan with patient who states her understanding.     RTC: 6-8 weeks for f/u on naltrexone therapy.     Vanesa Machuca MD    
toco q irregular
CTX q 1-3 min
CTX q 2-4 min
q irregular

## 2024-11-20 NOTE — OB PROVIDER LABOR PROGRESS NOTE - ASSESSMENT
30 yo  at 37.1 wks GA mIOL for PEC w/ SF s/p labetalol IVP x 1, on mag sulfate s/p s/p cytotec 60 #2 at 845a, GBS pos, cat 2 FHT  - cont current monitoring  - AROM clr  - ISE/IUPC placed  - terb 0.25mg SC given  - repositoned  - anesthesia at bedside but BP improved prior to administration of meds  - amp for GBS  - start amnioinfusion per Dr. Olvera    #PEC w/ SF  - cont mag sulfate for seizure ppx  - monitor s/sx worsening PEC/mag toxicity  - strict I/Os    pt seen and examined with Dr. Olvera 
a/p siup @ 37 weeks miol for PEC with severe f/x, currently on magnesium sulfate  - continue magnesium per protocol  - continue maternal fetal monitoring  - amp for gbs prophylaxis  - for po cytotec   - [place] [facility] offered  - Patient seen at bedside with DR Barnes, house attending
a/p siup @ 37w1d miol for PEC w/severe fx  - continue magnesium sulfate infusion  - mag 4.3> next mag level pending  - s/p po cytotec 60 #1, continue per protocol  - continue maternal/fetal monitor  - d/w DR Menchaca, house attending  
28 yo  at 37.1 wks GA mIOL for PEC w/ SF s/p labetalol IVP x 1, on mag sulfate s/p s/p cytotec 60 #2 at 845a, GBS pos, cat 2 FHT, s/p terb x 2  - cont current monitoring  - ISE/IUPC in place  - repositoned  - amp for GBS  - amnioinfusion in progress    #PEC w/ SF  - cont mag sulfate for seizure ppx  - monitor s/sx worsening PEC/mag toxicity  - strict I/Os    pt seen and examined with Dr. Olvera

## 2024-11-20 NOTE — OB RN INTRAOPERATIVE NOTE - NS_DELIVERYASSIST1_OBGYN_ALL_OB_FT
Acute onset of altered mental status at 9:30 PM today.  No recent trauma.  No anticoagulant use.  Will check labs, CT/CTA, IV, UA, flu/COVID, admission, will consider tenecteplase. Yael Louis - PA

## 2024-11-20 NOTE — OB PROVIDER LABOR PROGRESS NOTE - NS_OBIHIFHRDETAILS_OBGYN_ALL_OB_FT
fh baseline 135 moderate variability +accels
145 moderate variability with variable, late and prolonged decel
130 moderate variability with variable decel
fh baseline 135 moderate variability +accels

## 2024-11-20 NOTE — OB PROVIDER LABOR PROGRESS NOTE - NS_SUBJECTIVE/OBJECTIVE_OBGYN_ALL_OB_FT
PA Tracing note     FHT: baseline: 125, moderate variability, + accels, no decels  contractions q 4 minutes     continue current plan   continue PO cytotec per protocol
patient seen at bedside with DR Barnes, house attending  patient is doing well  no acute complaints  denies headache, blurry vision, epigastric pain  bp 139/93 hr 93  sve closed  patellar DTR 2+ b.l
pt comfortable  No headache, visual changes. No chest pain or SOB. No vomiting. No RUQ/epigastric pain.    131/81(110-130s/60-80s)  HR 90  O2 sat 98%
patient seen at bedside  patient states she feel more pressure   bp 148/90
pt s/p epidural. Pain improved  No headache, visual changes. No chest pain or SOB. No vomiting. No RUQ/epigastric pain.    124/75 (/)    O2 sat 99% RA  -200ml/hr

## 2024-11-21 LAB
ANION GAP SERPL CALC-SCNC: 8 MMOL/L — SIGNIFICANT CHANGE UP (ref 5–17)
BUN SERPL-MCNC: 4 MG/DL — LOW (ref 7–18)
CALCIUM SERPL-MCNC: 7.2 MG/DL — LOW (ref 8.4–10.5)
CHLORIDE SERPL-SCNC: 107 MMOL/L — SIGNIFICANT CHANGE UP (ref 96–108)
CO2 SERPL-SCNC: 20 MMOL/L — LOW (ref 22–31)
CREAT SERPL-MCNC: 0.75 MG/DL — SIGNIFICANT CHANGE UP (ref 0.5–1.3)
EGFR: 110 ML/MIN/1.73M2 — SIGNIFICANT CHANGE UP
GLUCOSE SERPL-MCNC: 135 MG/DL — HIGH (ref 70–99)
MAGNESIUM SERPL-MCNC: 5.8 MG/DL — HIGH (ref 1.6–2.6)
POTASSIUM SERPL-MCNC: 3.9 MMOL/L — SIGNIFICANT CHANGE UP (ref 3.5–5.3)
POTASSIUM SERPL-SCNC: 3.9 MMOL/L — SIGNIFICANT CHANGE UP (ref 3.5–5.3)
SODIUM SERPL-SCNC: 135 MMOL/L — SIGNIFICANT CHANGE UP (ref 135–145)

## 2024-11-21 RX ORDER — IBUPROFEN 200 MG
600 TABLET ORAL EVERY 6 HOURS
Refills: 0 | Status: DISCONTINUED | OUTPATIENT
Start: 2024-11-21 | End: 2024-11-22

## 2024-11-21 RX ADMIN — ACETAMINOPHEN 500MG 975 MILLIGRAM(S): 500 TABLET, COATED ORAL at 05:43

## 2024-11-21 RX ADMIN — Medication 600 MILLIGRAM(S): at 23:30

## 2024-11-21 RX ADMIN — Medication 1 TABLET(S): at 00:58

## 2024-11-21 RX ADMIN — Medication 50 GM/HR: at 06:39

## 2024-11-21 RX ADMIN — Medication 5000 UNIT(S): at 12:46

## 2024-11-21 RX ADMIN — Medication 600 MILLIGRAM(S): at 22:30

## 2024-11-21 RX ADMIN — Medication 80 MILLIGRAM(S): at 17:40

## 2024-11-21 NOTE — OB PROVIDER DELIVERY SUMMARY - NSPROVIDERDELIVERYNOTE_OBGYN_ALL_OB_FT
Delivered via lst CS liveborn female grover apgar 9/9.  Cord for gas and cord blood obtained.  Placenta intact.  Uterus firm. Hysterotomy closed with monocryl. Abdominal layers reapproximated in usual fashion.  Mother and baby stable. Delivered via lst CS liveborn female grover apgar 9/9.  Cord for gas and cord blood obtained.  Placenta intact.  Uterus firm. Hysterotomy closed with monocryl. Abdominal layers reapproximated in usual fashion.  Mother and baby stable.  dict# [id number]

## 2024-11-21 NOTE — CHART NOTE - NSCHARTNOTEFT_GEN_A_CORE
[facility]AR[facility] MÉN[facility] Event Note     Pt noted to have heavy lochia    T(C): 37.3 (11-20-24 @ 20:00), Max: 37.3 (11-20-24 @ 20:00)  HR: 94 (11-20-24 @ 20:00) (75 - 100)  BP: 153/86 (11-20-24 @ 20:00) (97/64 - 153/86)  RR: 20 (11-20-24 @ 20:00) (16 - 22)  SpO2: 94% (11-20-24 @ 20:00) (94% - 100%)  Wt(kg): --    Gen: A&Ox 3, NAD  Abdomen: +BS; Soft, nontender, ND; Fundus atonic above umbilicus; dressing removed, steris c/d/i  Gyn: heavy lochia with clots, expressed manually with heavy clots  Ext: Nontender, DTRS 2+, no worsening edema        A/P: 29y year old Female s/p c/s with preeclampsia, now with uterine atony; pph  cytotec 1000mg rectal  hemabate  txa  extra pit  cbc/bmp/fibrinogen ordered   at bedside  [facility]
Patient seen at bedside resting comfortably offers no new complaints, feels slightly drowsy. Denies HA, blurry vision, epigastric pain, CP, SOB, N/V/D, dizziness, palpitations, worsening abdominal pain, worsening vaginal bleeding. voiding into irving clr;  both breastfeeding and bottle feeding.     Vital Signs Last 24 Hrs  T(C): 37.3 (20 Nov 2024 20:00), Max: 37.3 (20 Nov 2024 20:00)  T(F): 99.1 (20 Nov 2024 20:00), Max: 99.1 (20 Nov 2024 20:00)  HR: 89 (21 Nov 2024 00:00) (75 - 108)  BP: 141/90 (21 Nov 2024 00:00) (97/64 - 153/86)  BP(mean): 89 (21 Nov 2024 00:00) (42 - 111)  RR: 16 (21 Nov 2024 00:00) (16 - 22)  SpO2: 110% (21 Nov 2024 00:00) (94% - 110%)    Parameters below as of 21 Nov 2024 00:00  Patient On (Oxygen Delivery Method): room air       urinary output approx _200cc hourly    Gen: A&O x 3, NAD  Chest: CTA B/L  Cardiac: S1,S2 RRR  Breast: Soft, nontender, nonengorged  Abdomen: soft; Nontender, nondistended; fundus firm slightly above umbilicus  Gyn: Minimal lochia  Extremities: Nontender, DTRS 2+ b/l; edema 2+b/l; negative clonus; venodynes in place                          11.5   17.03 )-----------( 202      ( 20 Nov 2024 23:37 )             32.2     11-20    135  |  107  |  4[L]  ----------------------------<  135[H]  3.9   |  20[L]  |  0.75    Ca    7.2[L]      20 Nov 2024 23:15  Mg     6.3     11-20    TPro  6.9  /  Alb  2.8[L]  /  TBili  0.5  /  DBili  x   /  AST  19  /  ALT  17  /  AlkPhos  119  11-19    LIVER FUNCTIONS - ( 19 Nov 2024 16:25 )  Alb: 2.8 g/dL / Pro: 6.9 g/dL / ALK PHOS: 119 U/L / ALT: 17 U/L DA / AST: 19 U/L / GGT: x           PT/INR - ( 19 Nov 2024 16:25 )   PT: 10.7 sec;   INR: 0.92 ratio         PTT - ( 20 Nov 2024 23:37 )  PTT:26.6 sec  Urinalysis Basic - ( 20 Nov 2024 23:15 )    Color: x / Appearance: x / SG: x / pH: x  Gluc: 135 mg/dL / Ketone: x  / Bili: x / Urobili: x   Blood: x / Protein: x / Nitrite: x   Leuk Esterase: x / RBC: x / WBC x   Sq Epi: x / Non Sq Epi: x / Bacteria: x      A/P: POD #1 s/p STAT c/s @ 37 1/7 weeks; PEC w/ SF; s/p magnesium since 1540 on 11/20; PPH s/p 1000mg rectal cytotec, hemabate, TXA and extra pitocin  -Pain management as needed  -cont post op care  -cont mag sulfate until 1540 11/21  -f/u Rpt hellp labs   -routine serum magnesium levels  -continue pad checks to monitor lochia for pph  -venodynes/lovenox for VTE ppx  -d/w dr Olvera

## 2024-11-21 NOTE — PROGRESS NOTE ADULT - SUBJECTIVE AND OBJECTIVE BOX
Patient seen resting comfortably.  No new complaints.  Denies HA, CP, SOB, N/V/D, dizziness, palpitations, worsening abdominal pain, worsening vaginal bleeding, or any other concerns.  Due to ambulate, irving to be removed and due to void, tolerating clear diet at this time.  Attempting breastfeeding.      Vital Signs Last 24 Hrs  T(C): 37.1 (2024 13:54), Max: 37.3 (2024 20:00)  T(F): 98.7 (2024 13:54), Max: 99.1 (2024 20:00)  HR: 85 (2024 15:) (76 - 108)  BP: 116/66 (2024 15:) (97/64 - 153/86)  BP(mean): 84 (2024 15:) (42 - 111)  RR: 16 (2024 15:) (16 - 22)  SpO2: 97% (2024 15:) (94% - 110%)    Parameters below as of 2024 15:01  Patient On (Oxygen Delivery Method): room air        Gen: A&O x 3, NAD  Chest: CTABL  Cardiac: S1+S2+ RRR  Breast: Soft, nontender, nonengorged  Abdomen: Nontender, nondistended, +BS, Incision C/D/I  Gyn: Minimal bleeding  Extremities: Nontender, DTRS 2+, no worsening edema, venodynes intact                          11.5   17.03 )-----------(       ( 2024 23:37 )             32.2       A/P:  Patient s/p  section, POD #1 now s/p 24 hours of magnesium for transfer to postpartum unit.     -Pain management as needed  -Advance as per protocol  -OOB and ambulate  -Repeat CBC in am  -DC irving and trial of void  -Case management   -Advance diet with flatus  -Encourage breastfeeding

## 2024-11-21 NOTE — PROGRESS NOTE ADULT - SUBJECTIVE AND OBJECTIVE BOX
Patient seen resting comfortably.  No new complaints.  Denies HA, CP, SOB, N/V/D, dizziness, palpitations, worsening abdominal pain, worsening vaginal bleeding, or any other concerns.  On magnesium x 24 hours for pre-eclampsia with severe features.        Vital Signs Last 24 Hrs  T(C): 37 (2024 07:22), Max: 37.3 (2024 20:00)  T(F): 98.6 (2024 07:22), Max: 99.1 (2024 20:00)  HR: 84 (2024 09:01) (76 - 108)  BP: 131/60 (2024 09:01) (97/64 - 153/86)  BP(mean): 80 (2024 08:00) (42 - 111)  RR: 18 (2024 09:01) (16 - 22)  SpO2: 96% (2024 09:01) (94% - 110%)    Parameters below as of 2024 08:00  Patient On (Oxygen Delivery Method): room air    Adequate urine output    Gen: A&O x 3, NAD  Chest: CTABL  Cardiac: S1+S2+ RRR  Breast: Soft, nontender, nonengorged  Abdomen: Nontender, nondistended, +BS, Incision C/D/I  Gyn: Minimal bleeding  Extremities: Nontender, DTRS 2+, no worsening edema, venodynes intact                          11.5   17.03 )-----------( 202      ( 2024 23:37 )             32.2     11-20    135  |  107  |  4[L]  ----------------------------<  135[H]  3.9   |  20[L]  |  0.75    Ca    7.2[L]      2024 23:15  Mg     5.8     -    TPro  6.9  /  Alb  2.8[L]  /  TBili  0.5  /  DBili  x   /  AST  19  /  ALT  17  /  AlkPhos  119  11-        A/P:  Patient s/p  section, POD #1 on magnesium x 24 hours for pre-eclampsia with severe features.  Stable     -Pain management as needed  -Advance as per protocol  -Continue magnesium x 24 hours  -Case management for BP monitoring  -Labs q 6 hours, neuro checks and strict I/O    Dr Crespo aware

## 2024-11-21 NOTE — OB PROVIDER DELIVERY SUMMARY - NSSELHIDDEN_OBGYN_ALL_OB_FT
[[email or web address]_OB_FT:[email or web address]],[NS_DeliveryAssist1_OBGYN_ALL_OB_FT:[id number]]

## 2024-11-22 ENCOUNTER — TRANSCRIPTION ENCOUNTER (OUTPATIENT)
Age: 29
End: 2024-11-22

## 2024-11-22 VITALS
OXYGEN SATURATION: 97 % | SYSTOLIC BLOOD PRESSURE: 135 MMHG | HEART RATE: 78 BPM | RESPIRATION RATE: 18 BRPM | DIASTOLIC BLOOD PRESSURE: 84 MMHG | TEMPERATURE: 98 F

## 2024-11-22 PROBLEM — O14.90 UNSPECIFIED PRE-ECLAMPSIA, UNSPECIFIED TRIMESTER: Chronic | Status: ACTIVE | Noted: 2024-11-19

## 2024-11-22 PROBLEM — Z86.19 PERSONAL HISTORY OF OTHER INFECTIOUS AND PARASITIC DISEASES: Chronic | Status: ACTIVE | Noted: 2024-11-19

## 2024-11-22 PROCEDURE — 86900 BLOOD TYPING SEROLOGIC ABO: CPT

## 2024-11-22 PROCEDURE — 88305 TISSUE EXAM BY PATHOLOGIST: CPT

## 2024-11-22 PROCEDURE — 86850 RBC ANTIBODY SCREEN: CPT

## 2024-11-22 PROCEDURE — 86780 TREPONEMA PALLIDUM: CPT

## 2024-11-22 PROCEDURE — 86901 BLOOD TYPING SEROLOGIC RH(D): CPT

## 2024-11-22 PROCEDURE — 86593 SYPHILIS TEST NON-TREP QUANT: CPT

## 2024-11-22 PROCEDURE — 85384 FIBRINOGEN ACTIVITY: CPT

## 2024-11-22 PROCEDURE — 82570 ASSAY OF URINE CREATININE: CPT

## 2024-11-22 PROCEDURE — 85025 COMPLETE CBC W/AUTO DIFF WBC: CPT

## 2024-11-22 PROCEDURE — 84550 ASSAY OF BLOOD/URIC ACID: CPT

## 2024-11-22 PROCEDURE — 36415 COLL VENOUS BLD VENIPUNCTURE: CPT

## 2024-11-22 PROCEDURE — C1889: CPT

## 2024-11-22 PROCEDURE — 86923 COMPATIBILITY TEST ELECTRIC: CPT

## 2024-11-22 PROCEDURE — 80053 COMPREHEN METABOLIC PANEL: CPT

## 2024-11-22 PROCEDURE — 81001 URINALYSIS AUTO W/SCOPE: CPT

## 2024-11-22 PROCEDURE — 59050 FETAL MONITOR W/REPORT: CPT

## 2024-11-22 PROCEDURE — 85610 PROTHROMBIN TIME: CPT

## 2024-11-22 PROCEDURE — 85730 THROMBOPLASTIN TIME PARTIAL: CPT

## 2024-11-22 PROCEDURE — 86592 SYPHILIS TEST NON-TREP QUAL: CPT

## 2024-11-22 PROCEDURE — 83735 ASSAY OF MAGNESIUM: CPT

## 2024-11-22 PROCEDURE — 83615 LACTATE (LD) (LDH) ENZYME: CPT

## 2024-11-22 PROCEDURE — 84156 ASSAY OF PROTEIN URINE: CPT

## 2024-11-22 PROCEDURE — 80048 BASIC METABOLIC PNL TOTAL CA: CPT

## 2024-11-22 RX ORDER — ACETAMINOPHEN 500MG 500 MG/1
2 TABLET, COATED ORAL
Qty: 112 | Refills: 0
Start: 2024-11-22 | End: 2024-12-05

## 2024-11-22 RX ORDER — .BETA.-CAROTENE, SODIUM ACETATE, ASCORBIC ACID, CHOLECALCIFEROL, .ALPHA.-TOCOPHEROL ACETATE, DL-, THIAMINE MONONITRATE, RIBOFLAVIN, NIACINAMIDE, PYRIDOXINE HYDROCHLORIDE, FOLIC ACID, CYANOCOBALAMIN, CALCIUM CARBONATE, FERROUS FUMARATE, ZINC OXIDE AND CUPRIC OXIDE 2000; 2000; 120; 400; 22; 1.84; 3; 20; 10; 1; 12; 200; 27; 25; 2 [IU]/1; [IU]/1; MG/1; [IU]/1; MG/1; MG/1; MG/1; MG/1; MG/1; MG/1; UG/1; MG/1; MG/1; MG/1; MG/1
1 TABLET ORAL
Qty: 30 | Refills: 0
Start: 2024-11-22 | End: 2024-12-21

## 2024-11-22 RX ORDER — IBUPROFEN 200 MG
1 TABLET ORAL
Qty: 56 | Refills: 0
Start: 2024-11-22 | End: 2024-12-05

## 2024-11-22 RX ORDER — SIMETHICONE 125 MG
1 CAPSULE ORAL
Qty: 56 | Refills: 0
Start: 2024-11-22 | End: 2024-12-05

## 2024-11-22 RX ORDER — OXYCODONE HYDROCHLORIDE 30 MG/1
5 TABLET ORAL
Refills: 0 | Status: DISCONTINUED | OUTPATIENT
Start: 2024-11-22 | End: 2024-11-22

## 2024-11-22 RX ORDER — SENNOSIDES/DOCUSATE SODIUM 8.6MG-50MG
2 TABLET ORAL
Qty: 28 | Refills: 0
Start: 2024-11-22 | End: 2024-12-05

## 2024-11-22 RX ADMIN — ACETAMINOPHEN 500MG 975 MILLIGRAM(S): 500 TABLET, COATED ORAL at 08:55

## 2024-11-22 RX ADMIN — OXYCODONE HYDROCHLORIDE 5 MILLIGRAM(S): 30 TABLET ORAL at 02:21

## 2024-11-22 RX ADMIN — Medication 5000 UNIT(S): at 01:20

## 2024-11-22 RX ADMIN — Medication 5000 UNIT(S): at 09:00

## 2024-11-22 RX ADMIN — Medication 600 MILLIGRAM(S): at 11:36

## 2024-11-22 RX ADMIN — ACETAMINOPHEN 500MG 975 MILLIGRAM(S): 500 TABLET, COATED ORAL at 09:55

## 2024-11-22 RX ADMIN — OXYCODONE HYDROCHLORIDE 5 MILLIGRAM(S): 30 TABLET ORAL at 01:21

## 2024-11-22 RX ADMIN — Medication 600 MILLIGRAM(S): at 06:26

## 2024-11-22 NOTE — DISCHARGE NOTE OB - PLAN OF CARE
Stable No sexual activity, nothing in the vagina, no heavy lifting, no pushing, no straining, no strenuous activities.  Take pain medication as needed; stool softener; dulcolax as needed if constipated  Walk for exercise: helps recovery   Continue prenatal vitamins daily especially whole course of breastfeeding  See your OB in the office for follow up post partum check  Clean wound daily with soap and water; please note wound for redness or swelling; if noted go to the office right away so the doctor can evaluate the wound for possible wound infection See obstetrician in 2-3 days for blood pressure check  Make sure you check your blood pressure 3-4 day and record all the pressures for the doctor to see  Return to the emergency room if the blood pressure is >150 systolic and/or >100 diastolic  Return to ER if you have headache, epigastric pain, visual changes or increased swelling    Please don't take your blood pressure medication if your systolic bp is <100 or diastolic <60. Pulse <60.

## 2024-11-22 NOTE — LACTATION INITIAL EVALUATION - LACTATION INTERVENTIONS
Breastfeeding on cue 8-12X/24 hours with diaper count to assess for adequate intake, safe skin to skin and rooming-in encouraged. Mom requests formula. Explored reason and discussed risks of supplementing while breastfeeding without medical reason. Mom still chooses to supplement with formula. Explained risks of using artificial nipples and discussed options for using alternative feeding methods instead of nipple.  Safe formula preparation and feeding handout provided and discussed./initiate/review safe skin-to-skin/initiate/review hand expression/initiate/review techniques for position and latch/review techniques to increase milk supply/review techniques to manage sore nipples/engorgement/initiate/review breast massage/compression/reviewed components of an effective feeding and at least 8 effective feedings per day required/reviewed importance of monitoring infant diapers, the breastfeeding log, and minimum output each day/reviewed benefits and recommendations for rooming in/reviewed feeding on demand/by cue at least 8 times a day/reviewed indications of inadequate milk transfer that would require supplementation

## 2024-11-22 NOTE — DISCHARGE NOTE OB - PATIENT PORTAL LINK FT
You can access the FollowMyHealth Patient Portal offered by [facility] by registering at the following website: [email or web address]. By joining [employer]’s FollowMyHealth portal, you will also be able to view your health information using other applications (apps) compatible with our system.

## 2024-11-22 NOTE — DISCHARGE NOTE OB - MATERIALS PROVIDED
Vaccinations/[facility]  Screening Program/  Immunization Record/Breastfeeding Mother’s Support Group Information/Guide to Postpartum Care/[facility] Hearing Screen Program/Back To Sleep Handout/Shaken Baby Prevention Handout/Breastfeeding Guide and Packet/Birth Certificate Instructions

## 2024-11-22 NOTE — PROGRESS NOTE ADULT - SUBJECTIVE AND OBJECTIVE BOX
Patient seen at bedside resting comfortably offers no new complaints. + Ambulation, + void without difficulty, + flatus;  + bm; tolerating regular diet. Pt both breastfeeding and bottle feeding. Pt denies weakness, headache, blurry vision or epigastric pain, chest pain, shortness of breath, N/V/D,  dizziness, palpitations, worsening vaginal bleeding.    Vital Signs Last 24 Hrs  T(C): 36.7 (22 Nov 2024 05:34), Max: 37.1 (21 Nov 2024 13:54)  T(F): 98 (22 Nov 2024 05:34), Max: 98.7 (21 Nov 2024 13:54)  HR: 87 (22 Nov 2024 05:34) (76 - 96)  BP: 136/80 (22 Nov 2024 05:34) (116/66 - 139/87)  BP(mean): 80 (21 Nov 2024 16:01) (80 - 84)  RR: 18 (22 Nov 2024 05:34) (16 - 18)  SpO2: 98% (22 Nov 2024 05:34) (95% - 98%)    Parameters below as of 22 Nov 2024 05:34  Patient On (Oxygen Delivery Method): room air        Gen: A&O x 3, NAD  Chest: CTABL  Cardiac: S1, S2, RRR  Abdomen: +BS; soft; Nontender, nondistended, Incision C/D/I steri strips in place, fundus is firm and below the umbilicus    Gyn: Minimal lochia  Extremities: Nontender, DTRS 2+, no worsening edema                          11.5   17.03 )-----------( 202      ( 20 Nov 2024 23:37 )             32.2

## 2024-11-22 NOTE — DISCHARGE NOTE OB - CARE PLAN
1 Principal Discharge DX:	 delivery delivered  Assessment and plan of treatment:	No sexual activity, nothing in the vagina, no heavy lifting, no pushing, no straining, no strenuous activities.  Take pain medication as needed; stool softener; dulcolax as needed if constipated  Walk for exercise: helps recovery   Continue prenatal vitamins daily especially whole course of breastfeeding  See your OB in the office for follow up post partum check  Clean wound daily with soap and water; please note wound for redness or swelling; if noted go to the office right away so the doctor can evaluate the wound for possible wound infection  Secondary Diagnosis:	Pre-eclampsia  Assessment and plan of treatment:	See obstetrician in 2-3 days for blood pressure check  Make sure you check your blood pressure 3-4 day and record all the pressures for the doctor to see  Return to the emergency room if the blood pressure is >150 systolic and/or >100 diastolic  Return to ER if you have headache, epigastric pain, visual changes or increased swelling    Please don't take your blood pressure medication if your systolic bp is <100 or diastolic <60. Pulse <60.  Secondary Diagnosis:	Postpartum hemorrhage  Assessment and plan of treatment:	Stable

## 2024-11-22 NOTE — DISCHARGE NOTE OB - FINANCIAL ASSISTANCE
[facility] provides services at a reduced cost to those who are determined to be eligible through [facility]’s financial assistance program. Information regarding [facility]’s financial assistance program can be found by going to [email or web address] or by calling [phone].

## 2024-11-22 NOTE — LACTATION INITIAL EVALUATION - POTENTIAL FOR
ineffective breastfeeding/sore breast/s/sore nipples/engorgement/knowledge deficit/mastitis/feeding confusion/latch on difficulty/low supply/delayed secretory activation

## 2024-11-22 NOTE — DISCHARGE NOTE OB - HOSPITAL COURSE
s/p stat c/s, admitted for iol due to pec w/ sf. pt received mag sulfate for 24h. routine post op care. Pt stable for discharge

## 2024-11-22 NOTE — DISCHARGE NOTE OB - CARE PROVIDER_API CALL
Katharine Crespo  Obstetrics and Gynecology  [address] [place] [address]  Phone: [phone]  Fax: [phone]  Follow Up Time: 1-3 days

## 2024-11-22 NOTE — PROGRESS NOTE ADULT - ASSESSMENT
A/P: 28 yo POD #2 s/p stat  c/s @37w1d, PEC w/ SF s/p mag sulfate for 24h, PPH s/p txa/pit/hemabate/cytotec,  pt stable    -Pain management as needed  -cont post op care  -VTE prophylaxis: heparin OOB and ambulate  - Labs and vitals reviewed   -encourage incentive spirometer use  -Encourage breastfeeding   - Pt requesting early discharge   - discharge instructions and BP monitoring instructions reviewed   - Home medications sent   - SW and CM consulted    -d/w Dr. Martinez  A/P: 28 yo POD #2 s/p stat  c/s @37w1d, PEC w/ SF s/p mag sulfate for 24h, PPH s/p txa/pit/hemabate/cytotec,  pt stable    -Pain management as needed  -cont post op care  -VTE prophylaxis: heparin OOB and ambulate  - Labs and vitals reviewed   -encourage incentive spirometer use  -Encourage breastfeeding   - Pt requesting early discharge   - pt has bp cuff at home  - discharge instructions and BP monitoring instructions reviewed   - Home medications sent   - SW and CM consulted    -d/w Dr. Martinez

## 2024-12-05 ENCOUNTER — APPOINTMENT (OUTPATIENT)
Dept: OBGYN | Facility: CLINIC | Age: 29
End: 2024-12-05
Payer: MEDICAID

## 2024-12-05 ENCOUNTER — OUTPATIENT (OUTPATIENT)
Dept: OUTPATIENT SERVICES | Facility: HOSPITAL | Age: 29
LOS: 1 days | End: 2024-12-05
Payer: MEDICAID

## 2024-12-05 VITALS
RESPIRATION RATE: 18 BRPM | HEART RATE: 95 BPM | TEMPERATURE: 97.3 F | OXYGEN SATURATION: 97 % | HEIGHT: 65 IN | SYSTOLIC BLOOD PRESSURE: 133 MMHG | WEIGHT: 203 LBS | BODY MASS INDEX: 33.82 KG/M2 | DIASTOLIC BLOOD PRESSURE: 83 MMHG

## 2024-12-05 DIAGNOSIS — Z30.42 ENCOUNTER FOR SURVEILLANCE OF INJECTABLE CONTRACEPTIVE: ICD-10-CM

## 2024-12-05 DIAGNOSIS — B95.1 STREPTOCOCCUS, GROUP B, AS THE CAUSE OF DISEASES CLASSIFIED ELSEWHERE: ICD-10-CM

## 2024-12-05 DIAGNOSIS — Z98.891 HISTORY OF UTERINE SCAR FROM PREVIOUS SURGERY: ICD-10-CM

## 2024-12-05 DIAGNOSIS — Z98.890 OTHER SPECIFIED POSTPROCEDURAL STATES: Chronic | ICD-10-CM

## 2024-12-05 DIAGNOSIS — Z00.00 ENCOUNTER FOR GENERAL ADULT MEDICAL EXAMINATION WITHOUT ABNORMAL FINDINGS: ICD-10-CM

## 2024-12-05 DIAGNOSIS — Z34.90 ENCOUNTER FOR SUPERVISION OF NORMAL PREGNANCY, UNSPECIFIED, UNSPECIFIED TRIMESTER: ICD-10-CM

## 2024-12-05 DIAGNOSIS — O14.93 UNSPECIFIED PRE-ECLAMPSIA, THIRD TRIMESTER: ICD-10-CM

## 2024-12-05 DIAGNOSIS — E66.812 OBESITY, CLASS 2: ICD-10-CM

## 2024-12-05 DIAGNOSIS — R03.0 ELEVATED BLOOD-PRESSURE READING, W/OUT DIAGNOSIS OF HYPERTENSION: ICD-10-CM

## 2024-12-05 PROCEDURE — 99213 OFFICE O/P EST LOW 20 MIN: CPT

## 2024-12-05 PROCEDURE — G0463: CPT

## 2024-12-05 RX ORDER — MEDROXYPROGESTERONE ACETATE 150 MG/ML
150 INJECTION, SUSPENSION INTRAMUSCULAR
Qty: 1 | Refills: 3 | Status: ACTIVE | COMMUNITY
Start: 2024-12-05 | End: 1900-01-01

## 2024-12-10 DIAGNOSIS — Z98.891 HISTORY OF UTERINE SCAR FROM PREVIOUS SURGERY: ICD-10-CM

## 2025-01-02 ENCOUNTER — APPOINTMENT (OUTPATIENT)
Dept: OBGYN | Facility: CLINIC | Age: 30
End: 2025-01-02
Payer: MEDICAID

## 2025-01-02 ENCOUNTER — OUTPATIENT (OUTPATIENT)
Dept: OUTPATIENT SERVICES | Facility: HOSPITAL | Age: 30
LOS: 1 days | End: 2025-01-02
Payer: MEDICAID

## 2025-01-02 VITALS
RESPIRATION RATE: 18 BRPM | TEMPERATURE: 96.7 F | OXYGEN SATURATION: 97 % | HEIGHT: 65 IN | DIASTOLIC BLOOD PRESSURE: 84 MMHG | WEIGHT: 201 LBS | HEART RATE: 77 BPM | SYSTOLIC BLOOD PRESSURE: 118 MMHG | BODY MASS INDEX: 33.49 KG/M2

## 2025-01-02 DIAGNOSIS — Z00.00 ENCOUNTER FOR GENERAL ADULT MEDICAL EXAMINATION WITHOUT ABNORMAL FINDINGS: ICD-10-CM

## 2025-01-02 DIAGNOSIS — Z98.890 OTHER SPECIFIED POSTPROCEDURAL STATES: Chronic | ICD-10-CM

## 2025-01-02 DIAGNOSIS — Z30.42 ENCOUNTER FOR SURVEILLANCE OF INJECTABLE CONTRACEPTIVE: ICD-10-CM

## 2025-01-02 PROCEDURE — 99213 OFFICE O/P EST LOW 20 MIN: CPT

## 2025-01-02 PROCEDURE — G0463: CPT

## 2025-01-03 DIAGNOSIS — Z30.42 ENCOUNTER FOR SURVEILLANCE OF INJECTABLE CONTRACEPTIVE: ICD-10-CM

## 2025-02-06 NOTE — OB RN PATIENT PROFILE - VISION (WITH CORRECTIVE LENSES IF THE PATIENT USUALLY WEARS THEM):
Outpatient Care Management Note:    Re:  follow up - diabetes/HTN meds     Patient seen by Endo and PCP yesterday 2/5. A1C checked and 6.5% from 8.6% but likely inaccurate due to anemia.     Patient to follow up with PCP in 6 weeks.   Endo 1 month and bring blood sugar log     Endo requesting for patient to take insulin 70/30 20 units in AM and 10 units in PM. Resent to pharmacy. Patient to check blood sugar before meals and at bedtime. Per notes have not checked blood sugar or taken insulin in 3 months.     Patient referred to diabetic education     Patient taking atenolol and nifedipine for blood pressure. Only taking hydralazine once a day and not three times a day as prescribed. Hydralazine discontinued.     Labs ordered     I called patient and no answer. Message left with my role and reason for call and requested a call back.        Normal vision: sees adequately in most situations; can see medication labels, newsprint

## 2025-02-07 ENCOUNTER — OUTPATIENT (OUTPATIENT)
Dept: OUTPATIENT SERVICES | Facility: HOSPITAL | Age: 30
LOS: 1 days | End: 2025-02-07
Payer: MEDICAID

## 2025-02-07 ENCOUNTER — APPOINTMENT (OUTPATIENT)
Dept: OBGYN | Facility: CLINIC | Age: 30
End: 2025-02-07
Payer: MEDICAID

## 2025-02-07 VITALS
BODY MASS INDEX: 32.49 KG/M2 | OXYGEN SATURATION: 100 % | HEART RATE: 89 BPM | RESPIRATION RATE: 18 BRPM | TEMPERATURE: 98.6 F | WEIGHT: 195 LBS | DIASTOLIC BLOOD PRESSURE: 80 MMHG | HEIGHT: 65 IN | SYSTOLIC BLOOD PRESSURE: 111 MMHG

## 2025-02-07 DIAGNOSIS — Z98.890 OTHER SPECIFIED POSTPROCEDURAL STATES: Chronic | ICD-10-CM

## 2025-02-07 DIAGNOSIS — N93.9 ABNORMAL UTERINE AND VAGINAL BLEEDING, UNSPECIFIED: ICD-10-CM

## 2025-02-07 DIAGNOSIS — Z00.00 ENCOUNTER FOR GENERAL ADULT MEDICAL EXAMINATION WITHOUT ABNORMAL FINDINGS: ICD-10-CM

## 2025-02-07 PROCEDURE — G0463: CPT

## 2025-02-07 PROCEDURE — 99213 OFFICE O/P EST LOW 20 MIN: CPT

## 2025-02-07 RX ORDER — LEVONORGESTREL AND ETHINYL ESTRADIOL 0.1-0.02MG
0.1-2 KIT ORAL
Qty: 1 | Refills: 0 | Status: ACTIVE | COMMUNITY
Start: 2025-02-07 | End: 1900-01-01

## 2025-02-11 DIAGNOSIS — N93.9 ABNORMAL UTERINE AND VAGINAL BLEEDING, UNSPECIFIED: ICD-10-CM

## 2025-04-01 ENCOUNTER — OUTPATIENT (OUTPATIENT)
Dept: OUTPATIENT SERVICES | Facility: HOSPITAL | Age: 30
LOS: 1 days | End: 2025-04-01
Payer: MEDICAID

## 2025-04-01 ENCOUNTER — APPOINTMENT (OUTPATIENT)
Dept: OBGYN | Facility: CLINIC | Age: 30
End: 2025-04-01

## 2025-04-01 VITALS
TEMPERATURE: 98.5 F | HEART RATE: 78 BPM | HEIGHT: 65 IN | DIASTOLIC BLOOD PRESSURE: 88 MMHG | OXYGEN SATURATION: 100 % | BODY MASS INDEX: 32.99 KG/M2 | WEIGHT: 198 LBS | SYSTOLIC BLOOD PRESSURE: 125 MMHG | RESPIRATION RATE: 16 BRPM

## 2025-04-01 DIAGNOSIS — Z00.00 ENCOUNTER FOR GENERAL ADULT MEDICAL EXAMINATION WITHOUT ABNORMAL FINDINGS: ICD-10-CM

## 2025-04-01 DIAGNOSIS — Z98.890 OTHER SPECIFIED POSTPROCEDURAL STATES: Chronic | ICD-10-CM

## 2025-04-01 PROCEDURE — 81025 URINE PREGNANCY TEST: CPT

## 2025-04-01 PROCEDURE — G0463: CPT

## 2025-04-01 PROCEDURE — 96372 THER/PROPH/DIAG INJ SC/IM: CPT

## 2025-04-02 DIAGNOSIS — Z32.02 ENCOUNTER FOR PREGNANCY TEST, RESULT NEGATIVE: ICD-10-CM

## 2025-04-03 DIAGNOSIS — Z30.42 ENCOUNTER FOR SURVEILLANCE OF INJECTABLE CONTRACEPTIVE: ICD-10-CM

## 2025-04-25 ENCOUNTER — LABORATORY RESULT (OUTPATIENT)
Age: 30
End: 2025-04-25

## 2025-04-25 ENCOUNTER — OUTPATIENT (OUTPATIENT)
Dept: OUTPATIENT SERVICES | Facility: HOSPITAL | Age: 30
LOS: 1 days | End: 2025-04-25
Payer: MEDICAID

## 2025-04-25 ENCOUNTER — APPOINTMENT (OUTPATIENT)
Dept: OBGYN | Facility: CLINIC | Age: 30
End: 2025-04-25

## 2025-04-25 VITALS
OXYGEN SATURATION: 97 % | TEMPERATURE: 97.2 F | SYSTOLIC BLOOD PRESSURE: 127 MMHG | DIASTOLIC BLOOD PRESSURE: 89 MMHG | BODY MASS INDEX: 32.32 KG/M2 | HEART RATE: 108 BPM | WEIGHT: 194 LBS | RESPIRATION RATE: 18 BRPM | HEIGHT: 65 IN

## 2025-04-25 DIAGNOSIS — Z98.890 OTHER SPECIFIED POSTPROCEDURAL STATES: Chronic | ICD-10-CM

## 2025-04-25 DIAGNOSIS — Z00.00 ENCOUNTER FOR GENERAL ADULT MEDICAL EXAMINATION WITHOUT ABNORMAL FINDINGS: ICD-10-CM

## 2025-04-25 DIAGNOSIS — Z01.419 ENCOUNTER FOR GYNECOLOGICAL EXAMINATION (GENERAL) (ROUTINE) W/OUT ABNORMAL FINDINGS: ICD-10-CM

## 2025-04-25 DIAGNOSIS — Z30.011 ENCOUNTER FOR INITIAL PRESCRIPTION OF CONTRACEPTIVE PILLS: ICD-10-CM

## 2025-04-25 PROCEDURE — 87661 TRICHOMONAS VAGINALIS AMPLIF: CPT

## 2025-04-25 PROCEDURE — 86592 SYPHILIS TEST NON-TREP QUAL: CPT

## 2025-04-25 PROCEDURE — 86593 SYPHILIS TEST NON-TREP QUANT: CPT

## 2025-04-25 PROCEDURE — 87591 N.GONORRHOEAE DNA AMP PROB: CPT

## 2025-04-25 PROCEDURE — 87481 CANDIDA DNA AMP PROBE: CPT

## 2025-04-25 PROCEDURE — G0463: CPT

## 2025-04-25 PROCEDURE — 81513 NFCT DS BV RNA VAG FLU ALG: CPT

## 2025-04-25 PROCEDURE — 87491 CHLMYD TRACH DNA AMP PROBE: CPT

## 2025-04-25 PROCEDURE — 87389 HIV-1 AG W/HIV-1&-2 AB AG IA: CPT

## 2025-04-25 PROCEDURE — 86780 TREPONEMA PALLIDUM: CPT

## 2025-04-25 PROCEDURE — 86803 HEPATITIS C AB TEST: CPT

## 2025-04-25 PROCEDURE — 87340 HEPATITIS B SURFACE AG IA: CPT

## 2025-04-25 PROCEDURE — 99213 OFFICE O/P EST LOW 20 MIN: CPT

## 2025-04-28 LAB
HBV SURFACE AG SER QL: NONREACTIVE
HCV AB SER QL: NONREACTIVE
HCV S/CO RATIO: 0.13 S/CO
HIV1+2 AB SPEC QL IA.RAPID: NONREACTIVE
T PALLIDUM AB SER QL IA: POSITIVE

## 2025-04-29 DIAGNOSIS — Z01.419 ENCOUNTER FOR GYNECOLOGICAL EXAMINATION (GENERAL) (ROUTINE) WITHOUT ABNORMAL FINDINGS: ICD-10-CM

## 2025-04-29 DIAGNOSIS — Z30.011 ENCOUNTER FOR INITIAL PRESCRIPTION OF CONTRACEPTIVE PILLS: ICD-10-CM

## 2025-04-30 DIAGNOSIS — N76.0 ACUTE VAGINITIS: ICD-10-CM

## 2025-04-30 DIAGNOSIS — B96.89 ACUTE VAGINITIS: ICD-10-CM

## 2025-04-30 LAB
BV BACTERIA RRNA VAG QL NAA+PROBE: DETECTED
C GLABRATA RNA VAG QL NAA+PROBE: NOT DETECTED
C TRACH RRNA SPEC QL NAA+PROBE: NOT DETECTED
CANDIDA RRNA VAG QL PROBE: NOT DETECTED
N GONORRHOEA RRNA SPEC QL NAA+PROBE: NOT DETECTED
T VAGINALIS RRNA SPEC QL NAA+PROBE: NOT DETECTED

## 2025-04-30 RX ORDER — METRONIDAZOLE 7.5 MG/G
0.75 GEL VAGINAL
Qty: 1 | Refills: 0 | Status: ACTIVE | COMMUNITY
Start: 2025-04-30 | End: 1900-01-01

## 2025-06-20 ENCOUNTER — APPOINTMENT (OUTPATIENT)
Dept: OBGYN | Facility: CLINIC | Age: 30
End: 2025-06-20
Payer: MEDICAID

## 2025-06-20 ENCOUNTER — APPOINTMENT (OUTPATIENT)
Dept: OBGYN | Facility: CLINIC | Age: 30
End: 2025-06-20

## 2025-06-20 ENCOUNTER — OUTPATIENT (OUTPATIENT)
Dept: OUTPATIENT SERVICES | Facility: HOSPITAL | Age: 30
LOS: 1 days | End: 2025-06-20
Payer: MEDICAID

## 2025-06-20 VITALS
BODY MASS INDEX: 32.32 KG/M2 | WEIGHT: 194 LBS | HEIGHT: 65 IN | RESPIRATION RATE: 16 BRPM | DIASTOLIC BLOOD PRESSURE: 85 MMHG | TEMPERATURE: 97.7 F | OXYGEN SATURATION: 99 % | HEART RATE: 84 BPM | SYSTOLIC BLOOD PRESSURE: 130 MMHG

## 2025-06-20 DIAGNOSIS — Z98.890 OTHER SPECIFIED POSTPROCEDURAL STATES: Chronic | ICD-10-CM

## 2025-06-20 DIAGNOSIS — Z00.00 ENCOUNTER FOR GENERAL ADULT MEDICAL EXAMINATION WITHOUT ABNORMAL FINDINGS: ICD-10-CM

## 2025-06-20 PROCEDURE — G0463: CPT

## 2025-06-20 PROCEDURE — 99213 OFFICE O/P EST LOW 20 MIN: CPT

## 2025-06-26 DIAGNOSIS — Z30.011 ENCOUNTER FOR INITIAL PRESCRIPTION OF CONTRACEPTIVE PILLS: ICD-10-CM
